# Patient Record
Sex: FEMALE | Race: WHITE | NOT HISPANIC OR LATINO | Employment: UNEMPLOYED | URBAN - METROPOLITAN AREA
[De-identification: names, ages, dates, MRNs, and addresses within clinical notes are randomized per-mention and may not be internally consistent; named-entity substitution may affect disease eponyms.]

---
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
## 2023-05-08 ENCOUNTER — OFFICE VISIT (OUTPATIENT)
Dept: PRIMARY CARE | Facility: CLINIC | Age: 63
End: 2023-05-08
Payer: COMMERCIAL

## 2023-05-08 VITALS
DIASTOLIC BLOOD PRESSURE: 66 MMHG | HEART RATE: 75 BPM | HEIGHT: 64 IN | RESPIRATION RATE: 16 BRPM | SYSTOLIC BLOOD PRESSURE: 116 MMHG | WEIGHT: 210 LBS | BODY MASS INDEX: 35.85 KG/M2

## 2023-05-08 DIAGNOSIS — E03.9 HYPOTHYROIDISM, UNSPECIFIED TYPE: ICD-10-CM

## 2023-05-08 DIAGNOSIS — F32.89 OTHER DEPRESSION: ICD-10-CM

## 2023-05-08 DIAGNOSIS — J84.10 CALCIFIED GRANULOMA OF LUNG (MULTI): ICD-10-CM

## 2023-05-08 DIAGNOSIS — Z00.00 BLOOD TESTS FOR ROUTINE GENERAL PHYSICAL EXAMINATION: ICD-10-CM

## 2023-05-08 DIAGNOSIS — E55.9 VITAMIN D DEFICIENCY: ICD-10-CM

## 2023-05-08 DIAGNOSIS — N95.1 INSOMNIA ASSOCIATED WITH MENOPAUSE: ICD-10-CM

## 2023-05-08 DIAGNOSIS — E53.8 B12 NUTRITIONAL DEFICIENCY: ICD-10-CM

## 2023-05-08 DIAGNOSIS — G43.001 MIGRAINE WITHOUT AURA AND WITH STATUS MIGRAINOSUS, NOT INTRACTABLE: Primary | ICD-10-CM

## 2023-05-08 DIAGNOSIS — E66.01 CLASS 3 SEVERE OBESITY WITH SERIOUS COMORBIDITY IN ADULT, UNSPECIFIED BMI, UNSPECIFIED OBESITY TYPE (MULTI): ICD-10-CM

## 2023-05-08 DIAGNOSIS — E78.5 HYPERLIPIDEMIA, UNSPECIFIED HYPERLIPIDEMIA TYPE: ICD-10-CM

## 2023-05-08 DIAGNOSIS — R73.03 PRE-DIABETES: ICD-10-CM

## 2023-05-08 DIAGNOSIS — J30.1 SEASONAL ALLERGIC RHINITIS DUE TO POLLEN: ICD-10-CM

## 2023-05-08 DIAGNOSIS — Z63.0 STRESS DUE TO MARITAL PROBLEMS: ICD-10-CM

## 2023-05-08 PROBLEM — E78.00 HYPERCHOLESTEROLEMIA: Status: ACTIVE | Noted: 2023-05-08

## 2023-05-08 PROBLEM — G47.00 INSOMNIA: Status: ACTIVE | Noted: 2023-05-08

## 2023-05-08 PROBLEM — T74.31XA: Status: RESOLVED | Noted: 2023-05-08 | Resolved: 2023-05-08

## 2023-05-08 PROBLEM — F32.A DEPRESSION: Status: ACTIVE | Noted: 2023-05-08

## 2023-05-08 PROBLEM — J30.9 ALLERGIC RHINITIS: Status: ACTIVE | Noted: 2023-05-08

## 2023-05-08 PROBLEM — R73.01 IFG (IMPAIRED FASTING GLUCOSE): Status: ACTIVE | Noted: 2023-05-08

## 2023-05-08 PROBLEM — R01.1 SYSTOLIC EJECTION MURMUR: Status: ACTIVE | Noted: 2023-05-08

## 2023-05-08 PROBLEM — R06.02 SOBOE (SHORTNESS OF BREATH ON EXERTION): Status: RESOLVED | Noted: 2023-05-08 | Resolved: 2023-05-08

## 2023-05-08 PROBLEM — F43.9 STRESS AT HOME: Status: ACTIVE | Noted: 2023-05-08

## 2023-05-08 PROBLEM — Z59.71 INSURANCE COVERAGE PROBLEMS: Status: RESOLVED | Noted: 2023-05-08 | Resolved: 2023-05-08

## 2023-05-08 PROBLEM — J98.4 CALCIFIED GRANULOMA OF LUNG: Status: ACTIVE | Noted: 2023-05-08

## 2023-05-08 PROBLEM — I70.0 ATHEROSCLEROSIS OF AORTA (CMS-HCC): Status: ACTIVE | Noted: 2023-05-08

## 2023-05-08 PROBLEM — Z59.89 INSURANCE COVERAGE PROBLEMS: Status: RESOLVED | Noted: 2023-05-08 | Resolved: 2023-05-08

## 2023-05-08 PROBLEM — L30.9 ECZEMA: Status: ACTIVE | Noted: 2023-05-08

## 2023-05-08 PROBLEM — R51.9 HEADACHE, CHRONIC DAILY: Status: RESOLVED | Noted: 2023-05-08 | Resolved: 2023-05-08

## 2023-05-08 PROBLEM — R82.90 ABNORMAL URINE FINDINGS: Status: RESOLVED | Noted: 2023-05-08 | Resolved: 2023-05-08

## 2023-05-08 PROBLEM — G43.009 MIGRAINE WITHOUT AURA AND WITHOUT STATUS MIGRAINOSUS, NOT INTRACTABLE: Status: ACTIVE | Noted: 2023-05-08

## 2023-05-08 PROBLEM — K58.9 IBS (IRRITABLE BOWEL SYNDROME): Status: ACTIVE | Noted: 2023-05-08

## 2023-05-08 PROBLEM — I35.1 AORTIC VALVE INSUFFICIENCY, ACQUIRED: Status: ACTIVE | Noted: 2023-05-08

## 2023-05-08 LAB — POC HEMOGLOBIN A1C: 5.4 % (ref 4.2–6.5)

## 2023-05-08 PROCEDURE — 1036F TOBACCO NON-USER: CPT | Performed by: INTERNAL MEDICINE

## 2023-05-08 PROCEDURE — 99215 OFFICE O/P EST HI 40 MIN: CPT | Performed by: INTERNAL MEDICINE

## 2023-05-08 PROCEDURE — 3008F BODY MASS INDEX DOCD: CPT | Performed by: INTERNAL MEDICINE

## 2023-05-08 PROCEDURE — 83036 HEMOGLOBIN GLYCOSYLATED A1C: CPT | Performed by: INTERNAL MEDICINE

## 2023-05-08 RX ORDER — METFORMIN HYDROCHLORIDE 500 MG/1
500 TABLET, EXTENDED RELEASE ORAL
Qty: 90 TABLET | Refills: 3 | Status: SHIPPED | OUTPATIENT
Start: 2023-05-08 | End: 2023-11-01 | Stop reason: SDUPTHER

## 2023-05-08 RX ORDER — THYROID 90 MG/1
TABLET ORAL
COMMUNITY
End: 2023-05-08 | Stop reason: SDUPTHER

## 2023-05-08 RX ORDER — THYROID 30 MG/1
15 TABLET ORAL 2 TIMES WEEKLY
Qty: 12 TABLET | Refills: 3 | Status: SHIPPED | OUTPATIENT
Start: 2023-05-08 | End: 2023-11-01 | Stop reason: SDUPTHER

## 2023-05-08 RX ORDER — THYROID 30 MG/1
TABLET ORAL
COMMUNITY
End: 2023-05-08 | Stop reason: SDUPTHER

## 2023-05-08 RX ORDER — VIT C/E/ZN/COPPR/LUTEIN/ZEAXAN 250MG-90MG
CAPSULE ORAL
COMMUNITY

## 2023-05-08 RX ORDER — METFORMIN HYDROCHLORIDE 500 MG/1
TABLET, EXTENDED RELEASE ORAL
COMMUNITY
Start: 2019-05-30 | End: 2023-05-08 | Stop reason: SDUPTHER

## 2023-05-08 RX ORDER — TOPIRAMATE 100 MG/1
100 TABLET, FILM COATED ORAL NIGHTLY
COMMUNITY
Start: 2023-04-18 | End: 2023-05-08 | Stop reason: SDUPTHER

## 2023-05-08 RX ORDER — HYDROCORTISONE 25 MG/G
CREAM TOPICAL
COMMUNITY
Start: 2022-12-27

## 2023-05-08 RX ORDER — MONTELUKAST SODIUM 4 MG/1
2 TABLET, CHEWABLE ORAL 2 TIMES DAILY
Qty: 360 TABLET | Refills: 3 | Status: SHIPPED | OUTPATIENT
Start: 2023-05-08 | End: 2023-11-01 | Stop reason: SDUPTHER

## 2023-05-08 RX ORDER — MONTELUKAST SODIUM 4 MG/1
TABLET, CHEWABLE ORAL
COMMUNITY
Start: 2016-10-05 | End: 2023-05-08 | Stop reason: SDUPTHER

## 2023-05-08 RX ORDER — LORATADINE 10 MG/1
10 TABLET ORAL DAILY
Qty: 30 TABLET | Refills: 11
Start: 2023-05-08 | End: 2024-05-07

## 2023-05-08 RX ORDER — UBIDECARENONE 75 MG
500 CAPSULE ORAL DAILY
Qty: 90 TABLET | Refills: 3 | Status: SHIPPED | OUTPATIENT
Start: 2023-05-08 | End: 2024-05-07

## 2023-05-08 RX ORDER — VENLAFAXINE HYDROCHLORIDE 75 MG/1
CAPSULE, EXTENDED RELEASE ORAL
COMMUNITY
Start: 2017-05-03 | End: 2023-05-08 | Stop reason: SDUPTHER

## 2023-05-08 RX ORDER — TRAZODONE HYDROCHLORIDE 50 MG/1
TABLET ORAL
Qty: 90 TABLET | Refills: 3 | Status: SHIPPED | OUTPATIENT
Start: 2023-05-08 | End: 2023-11-01 | Stop reason: SDUPTHER

## 2023-05-08 RX ORDER — VENLAFAXINE HYDROCHLORIDE 75 MG/1
75 CAPSULE, EXTENDED RELEASE ORAL 3 TIMES DAILY
Qty: 270 CAPSULE | Refills: 3 | Status: SHIPPED | OUTPATIENT
Start: 2023-05-08 | End: 2023-11-01 | Stop reason: SDUPTHER

## 2023-05-08 RX ORDER — UBIDECARENONE 75 MG
1 CAPSULE ORAL DAILY
COMMUNITY
Start: 2022-08-17 | End: 2023-05-08 | Stop reason: SDUPTHER

## 2023-05-08 RX ORDER — TOPIRAMATE 100 MG/1
100 TABLET, FILM COATED ORAL NIGHTLY
Qty: 90 TABLET | Refills: 3 | Status: SHIPPED | OUTPATIENT
Start: 2023-05-08 | End: 2023-11-01 | Stop reason: SDUPTHER

## 2023-05-08 RX ORDER — TRAZODONE HYDROCHLORIDE 50 MG/1
TABLET ORAL
COMMUNITY
End: 2023-05-08 | Stop reason: SDUPTHER

## 2023-05-08 RX ORDER — THYROID 90 MG/1
90 TABLET ORAL DAILY
Qty: 90 TABLET | Refills: 3 | Status: SHIPPED | OUTPATIENT
Start: 2023-05-08 | End: 2023-11-01 | Stop reason: SDUPTHER

## 2023-05-08 SDOH — ECONOMIC STABILITY: TRANSPORTATION INSECURITY
IN THE PAST 12 MONTHS, HAS LACK OF TRANSPORTATION KEPT YOU FROM MEETINGS, WORK, OR FROM GETTING THINGS NEEDED FOR DAILY LIVING?: NO

## 2023-05-08 SDOH — ECONOMIC STABILITY: INCOME INSECURITY: IN THE LAST 12 MONTHS, WAS THERE A TIME WHEN YOU WERE NOT ABLE TO PAY THE MORTGAGE OR RENT ON TIME?: NO

## 2023-05-08 SDOH — ECONOMIC STABILITY: HOUSING INSECURITY
IN THE LAST 12 MONTHS, WAS THERE A TIME WHEN YOU DID NOT HAVE A STEADY PLACE TO SLEEP OR SLEPT IN A SHELTER (INCLUDING NOW)?: NO

## 2023-05-08 SDOH — ECONOMIC STABILITY: HOUSING INSECURITY: IN THE LAST 12 MONTHS, HOW MANY PLACES HAVE YOU LIVED?: 1

## 2023-05-08 SDOH — HEALTH STABILITY: PHYSICAL HEALTH: ON AVERAGE, HOW MANY DAYS PER WEEK DO YOU ENGAGE IN MODERATE TO STRENUOUS EXERCISE (LIKE A BRISK WALK)?: 5 DAYS

## 2023-05-08 SDOH — ECONOMIC STABILITY: FOOD INSECURITY: WITHIN THE PAST 12 MONTHS, YOU WORRIED THAT YOUR FOOD WOULD RUN OUT BEFORE YOU GOT MONEY TO BUY MORE.: NEVER TRUE

## 2023-05-08 SDOH — SOCIAL STABILITY - SOCIAL INSECURITY: PROBLEMS IN RELATIONSHIP WITH SPOUSE OR PARTNER: Z63.0

## 2023-05-08 SDOH — ECONOMIC STABILITY: FOOD INSECURITY: WITHIN THE PAST 12 MONTHS, THE FOOD YOU BOUGHT JUST DIDN'T LAST AND YOU DIDN'T HAVE MONEY TO GET MORE.: NEVER TRUE

## 2023-05-08 SDOH — HEALTH STABILITY: PHYSICAL HEALTH: ON AVERAGE, HOW MANY MINUTES DO YOU ENGAGE IN EXERCISE AT THIS LEVEL?: 90 MIN

## 2023-05-08 SDOH — ECONOMIC STABILITY: TRANSPORTATION INSECURITY
IN THE PAST 12 MONTHS, HAS THE LACK OF TRANSPORTATION KEPT YOU FROM MEDICAL APPOINTMENTS OR FROM GETTING MEDICATIONS?: NO

## 2023-05-08 ASSESSMENT — SOCIAL DETERMINANTS OF HEALTH (SDOH)
WITHIN THE LAST YEAR, HAVE YOU BEEN HUMILIATED OR EMOTIONALLY ABUSED IN OTHER WAYS BY YOUR PARTNER OR EX-PARTNER?: YES
DO YOU BELONG TO ANY CLUBS OR ORGANIZATIONS SUCH AS CHURCH GROUPS UNIONS, FRATERNAL OR ATHLETIC GROUPS, OR SCHOOL GROUPS?: YES
IN A TYPICAL WEEK, HOW MANY TIMES DO YOU TALK ON THE PHONE WITH FAMILY, FRIENDS, OR NEIGHBORS?: TWICE A WEEK
HOW OFTEN DO YOU ATTEND CHURCH OR RELIGIOUS SERVICES?: MORE THAN 4 TIMES PER YEAR
WITHIN THE LAST YEAR, HAVE YOU BEEN KICKED, HIT, SLAPPED, OR OTHERWISE PHYSICALLY HURT BY YOUR PARTNER OR EX-PARTNER?: NO
WITHIN THE LAST YEAR, HAVE YOU BEEN AFRAID OF YOUR PARTNER OR EX-PARTNER?: NO
HOW OFTEN DO YOU GET TOGETHER WITH FRIENDS OR RELATIVES?: ONCE A WEEK
HOW OFTEN DO YOU ATTENT MEETINGS OF THE CLUB OR ORGANIZATION YOU BELONG TO?: MORE THAN 4 TIMES PER YEAR
HOW HARD IS IT FOR YOU TO PAY FOR THE VERY BASICS LIKE FOOD, HOUSING, MEDICAL CARE, AND HEATING?: NOT HARD AT ALL
WITHIN THE LAST YEAR, HAVE TO BEEN RAPED OR FORCED TO HAVE ANY KIND OF SEXUAL ACTIVITY BY YOUR PARTNER OR EX-PARTNER?: NO

## 2023-05-08 ASSESSMENT — PATIENT HEALTH QUESTIONNAIRE - PHQ9
2. FEELING DOWN, DEPRESSED OR HOPELESS: SEVERAL DAYS
10. IF YOU CHECKED OFF ANY PROBLEMS, HOW DIFFICULT HAVE THESE PROBLEMS MADE IT FOR YOU TO DO YOUR WORK, TAKE CARE OF THINGS AT HOME, OR GET ALONG WITH OTHER PEOPLE: SOMEWHAT DIFFICULT
SUM OF ALL RESPONSES TO PHQ9 QUESTIONS 1 & 2: 1
1. LITTLE INTEREST OR PLEASURE IN DOING THINGS: NOT AT ALL

## 2023-05-08 ASSESSMENT — PAIN SCALES - GENERAL: PAINLEVEL: 0-NO PAIN

## 2023-05-08 ASSESSMENT — LIFESTYLE VARIABLES
HOW OFTEN DO YOU HAVE SIX OR MORE DRINKS ON ONE OCCASION: NEVER
HOW MANY STANDARD DRINKS CONTAINING ALCOHOL DO YOU HAVE ON A TYPICAL DAY: PATIENT DOES NOT DRINK

## 2023-05-08 NOTE — PATIENT INSTRUCTIONS
Great job on the weight loss!  Glad you are going to marriage counseling.    Ear canals are fine.  Mild inflammation in nasal passages, consider temporarily going back on flonase and or saline nasal rinse at end of the day when you are inside for the night.    Same medications.   Follow up for annual and review of results august/sept.    Fasting blood work prior to that visit.  Instructions for fasting blood work.   Please do not consume calories for 10-12 hours prior to this blood test. It is ok to drink water, you should be hydrated.  Please do not take vitamins, supplements or thyroid medication before your blood is drawn this day.   Most lab  test results should be available for your review on the  My Chart portal within 48 hours.

## 2023-05-16 ENCOUNTER — TELEPHONE (OUTPATIENT)
Dept: PRIMARY CARE | Facility: CLINIC | Age: 63
End: 2023-05-16
Payer: COMMERCIAL

## 2023-05-16 NOTE — TELEPHONE ENCOUNTER
"I called her back. She started having left sided chest pain 3 days ago. It is a squeezing pain that comes and goes and is very painful when it happens. She worked out this am and it didn't hurt. She took naproxen and it didn't stop the pain and it doesn't change with exercise. No SOB, No SOBOE,  No arm pain, no jaw or neck pain. This squeezing pain does radiate into her upper inner back while the squeezing is happening. Pt is also having no appetite at all today. I have advised her to go to the ER ASAP.  She reports \"I will talk to the hubby and see what we can do\"  "

## 2023-05-16 NOTE — TELEPHONE ENCOUNTER
Patient she been having chest pains for the past 3 days    The pain is on the left side and it comes and goes. She states ir feel like a squeezing ing the left side.    Pt# [phone]  BN

## 2023-09-05 ENCOUNTER — APPOINTMENT (OUTPATIENT)
Dept: PRIMARY CARE | Facility: CLINIC | Age: 63
End: 2023-09-05
Payer: COMMERCIAL
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
## 2023-10-23 ENCOUNTER — LAB (OUTPATIENT)
Dept: LAB | Facility: LAB | Age: 63
End: 2023-10-23
Payer: COMMERCIAL

## 2023-10-23 DIAGNOSIS — E03.9 HYPOTHYROIDISM, UNSPECIFIED TYPE: ICD-10-CM

## 2023-10-23 DIAGNOSIS — R73.03 PRE-DIABETES: ICD-10-CM

## 2023-10-23 DIAGNOSIS — E53.8 B12 NUTRITIONAL DEFICIENCY: ICD-10-CM

## 2023-10-23 DIAGNOSIS — Z11.59 ENCOUNTER FOR HEPATITIS C SCREENING TEST FOR LOW RISK PATIENT: ICD-10-CM

## 2023-10-23 DIAGNOSIS — E78.5 HYPERLIPIDEMIA, UNSPECIFIED HYPERLIPIDEMIA TYPE: ICD-10-CM

## 2023-10-23 DIAGNOSIS — E55.9 VITAMIN D DEFICIENCY: ICD-10-CM

## 2023-10-23 DIAGNOSIS — Z00.00 BLOOD TESTS FOR ROUTINE GENERAL PHYSICAL EXAMINATION: ICD-10-CM

## 2023-10-23 LAB
25(OH)D3 SERPL-MCNC: 44 NG/ML (ref 30–100)
ALBUMIN SERPL BCP-MCNC: 4.4 G/DL (ref 3.4–5)
ALP SERPL-CCNC: 84 U/L (ref 33–136)
ALT SERPL W P-5'-P-CCNC: 18 U/L (ref 7–45)
ANION GAP SERPL CALC-SCNC: 14 MMOL/L (ref 10–20)
AST SERPL W P-5'-P-CCNC: 16 U/L (ref 9–39)
BASOPHILS # BLD AUTO: 0.05 X10*3/UL (ref 0–0.1)
BASOPHILS NFR BLD AUTO: 0.7 %
BILIRUB SERPL-MCNC: 0.7 MG/DL (ref 0–1.2)
BUN SERPL-MCNC: 18 MG/DL (ref 6–23)
CALCIUM SERPL-MCNC: 9.4 MG/DL (ref 8.6–10.6)
CHLORIDE SERPL-SCNC: 105 MMOL/L (ref 98–107)
CHOLEST SERPL-MCNC: 237 MG/DL (ref 0–199)
CHOLESTEROL/HDL RATIO: 4.3
CO2 SERPL-SCNC: 26 MMOL/L (ref 21–32)
CREAT SERPL-MCNC: 0.92 MG/DL (ref 0.5–1.05)
EOSINOPHIL # BLD AUTO: 0.21 X10*3/UL (ref 0–0.7)
EOSINOPHIL NFR BLD AUTO: 2.9 %
ERYTHROCYTE [DISTWIDTH] IN BLOOD BY AUTOMATED COUNT: 13.3 % (ref 11.5–14.5)
EST. AVERAGE GLUCOSE BLD GHB EST-MCNC: 114 MG/DL
GFR SERPL CREATININE-BSD FRML MDRD: 70 ML/MIN/1.73M*2
GLUCOSE SERPL-MCNC: 105 MG/DL (ref 74–99)
HBA1C MFR BLD: 5.6 %
HCT VFR BLD AUTO: 44 % (ref 36–46)
HCV AB SER QL: NONREACTIVE
HDLC SERPL-MCNC: 54.5 MG/DL
HGB BLD-MCNC: 13.9 G/DL (ref 12–16)
IMM GRANULOCYTES # BLD AUTO: 0.03 X10*3/UL (ref 0–0.7)
IMM GRANULOCYTES NFR BLD AUTO: 0.4 % (ref 0–0.9)
LDLC SERPL CALC-MCNC: 167 MG/DL
LDLC SERPL DIRECT ASSAY-MCNC: 168 MG/DL (ref 0–129)
LYMPHOCYTES # BLD AUTO: 1.59 X10*3/UL (ref 1.2–4.8)
LYMPHOCYTES NFR BLD AUTO: 21.8 %
MCH RBC QN AUTO: 29.6 PG (ref 26–34)
MCHC RBC AUTO-ENTMCNC: 31.6 G/DL (ref 32–36)
MCV RBC AUTO: 94 FL (ref 80–100)
MONOCYTES # BLD AUTO: 0.62 X10*3/UL (ref 0.1–1)
MONOCYTES NFR BLD AUTO: 8.5 %
NEUTROPHILS # BLD AUTO: 4.81 X10*3/UL (ref 1.2–7.7)
NEUTROPHILS NFR BLD AUTO: 65.7 %
NON HDL CHOLESTEROL: 183 MG/DL (ref 0–149)
NRBC BLD-RTO: 0 /100 WBCS (ref 0–0)
PLATELET # BLD AUTO: 232 X10*3/UL (ref 150–450)
PMV BLD AUTO: 11.1 FL (ref 7.5–11.5)
POTASSIUM SERPL-SCNC: 4.4 MMOL/L (ref 3.5–5.3)
PROT SERPL-MCNC: 6.8 G/DL (ref 6.4–8.2)
RBC # BLD AUTO: 4.7 X10*6/UL (ref 4–5.2)
SODIUM SERPL-SCNC: 141 MMOL/L (ref 136–145)
TRIGL SERPL-MCNC: 80 MG/DL (ref 0–149)
TSH SERPL-ACNC: 3.26 MIU/L (ref 0.44–3.98)
VIT B12 SERPL-MCNC: 955 PG/ML (ref 211–911)
VLDL: 16 MG/DL (ref 0–40)
WBC # BLD AUTO: 7.3 X10*3/UL (ref 4.4–11.3)

## 2023-10-23 PROCEDURE — 86803 HEPATITIS C AB TEST: CPT

## 2023-10-23 PROCEDURE — 80053 COMPREHEN METABOLIC PANEL: CPT

## 2023-10-23 PROCEDURE — 84443 ASSAY THYROID STIM HORMONE: CPT

## 2023-10-23 PROCEDURE — 83036 HEMOGLOBIN GLYCOSYLATED A1C: CPT

## 2023-10-23 PROCEDURE — 82607 VITAMIN B-12: CPT

## 2023-10-23 PROCEDURE — 85025 COMPLETE CBC W/AUTO DIFF WBC: CPT

## 2023-10-23 PROCEDURE — 82306 VITAMIN D 25 HYDROXY: CPT

## 2023-10-23 PROCEDURE — 83721 ASSAY OF BLOOD LIPOPROTEIN: CPT

## 2023-10-23 PROCEDURE — 80061 LIPID PANEL: CPT

## 2023-10-23 PROCEDURE — 36415 COLL VENOUS BLD VENIPUNCTURE: CPT

## 2023-10-23 NOTE — RESULT ENCOUNTER NOTE
I have reviewed these results and will discuss them with the patient at their upcoming appointment.  Cholesterol elevated/not at goal. B12 is ok.

## 2023-10-31 NOTE — PROGRESS NOTES
Subjective   Patient ID: Karis Adrian is a 63 y.o. female who presents for annual physical and follow up on conditions.  LAST VISIT PLAN 5/8/23  Problem List Items Addressed This Visit         Allergic rhinitis     Relevant Medications     loratadine (Claritin) 10 mg tablet  Consider flonase restart and or saline end of day nasal spray     B12 nutritional deficiency     Relevant Medications     cyanocobalamin (Vitamin B-12) 500 mcg tablet refilled     Other Relevant Orders     Vitamin B12     Calcified granuloma of lung (CMS/HCC)     Hypothyroidism     Relevant Medications     thyroid, pork, ([place] Thyroid) 90 mg tablet refilled     thyroid, pork, ([place] Thyroid) 30 mg tablet refilled     Other Relevant Orders     TSH with reflex to Free T4 if abnormal      Depression     Relevant Medications     venlafaxine XR (Effexor-XR) 75 mg 24 hr capsule refilled  Gla dthey are going to marriage counseling     Vitamin D deficiency     Relevant Orders     Vitamin D, Total      Other Visit Diagnoses         Migraine without aura and with status migrainosus, not intractable    -  Primary     Relevant Medications     topiramate (Topamax) 100 mg tablet     Hyperlipidemia, unspecified hyperlipidemia type         Relevant Medications     colestipol (Colestid) 1 gram tablet     Other Relevant Orders     Cholesterol, LDL Direct     Pre-diabetes         Relevant Medications     metFORMIN XR (Glucophage-XR) 500 mg 24 hr tablet     Other Relevant Orders     Hemoglobin L9Y--[id number]!!     Insomnia associated with menopause         Relevant Medications     traZODone (Desyrel) 50 mg tablet     Blood tests for routine general physical examination         Relevant Orders     CBC and Auto Differential     Lipid Panel     Comprehensive Metabolic Panel     TSH with reflex to Free T4 if abnormal     Stress due to marital problems    better, counseling.   Obesity  improving. Continue topiramate.  END INFO FROM PRIOR VISIT (SEE SCANNED ER  DISCHARGE SUMMARY FROM 6/9/23)  HPI  Pt with pre dm, obesity insomnia, anxiety, headaches and hypothyroidism here for annual check up. And pelvic, pap  Had cp-negative work up at er.  No further cp since in er may and was muscle strain.  Ran out of topiramate 6 weeks ago and now more headaches/migraines. Not as bad as pre topiramate  Health maintenance items last completed:  Colonoscopy: 9/22/22, 3 mm and 5 mm polyps. Diffuse diverticulosis.  Mammogram: 12/30/22 cat 1 - negative   Bone Density:   Urine albumin if HTN or DM2:   Pap: 05/30/2019 negative and today  Pelvic:today  Breast exam in office:today  Vaccines due or not completed: done today flu shot  Last Health Maintenance exam: 8/17/22  Asking about home sleep study as sister diagnosed and they told her it could be hereditary.  Sister is overweight as well    Sleep-trouble falling asleep but more the issue is waking up constantly during the night, wakes up 5-6 times if no trazodone.  Feels tired in am. Whether takes med or not.  Takes a nap every day but her whole family does that  No one dxd with narcolepsy  Not sure she could stay awake in the car for an hour if nothing to do  At night concern falllasleep driing.  Eats to keep awake while driving    Scribe Transcription:  She states her mood has been okay. She is taking 225 mg of venlafaxine. Things are better at home with her .    She states she has had significant weight gain. She has been having trouble falling asleep and has been waking up frequently throughout the night. If she doesn’t take trazodone at night she will wake 5-6 times per night. She reports feeling tired when she wakes up every morning. She feels tired often during the day stating most of her family takes naps during the day.     She reports anosmia and cannot smell coffee among other things. X several years.    Scribe Attestation  By signing my name below, ISharath, Carla   attest that this documentation has been  prepared under the direction and in the presence of Roseanne Correia MD.   Review of Systems  All systems reviewed and are negative unless otherwise stated in HPI or noted in writing on the written   7 page history and review of systems document reviewed by me and  scanned in with this visit.  No longer has periods as is s/p hyst, cerivx an dovaries remain so still eeds a pap. Only one partner.  Current Outpatient Medications   Medication Instructions    cholecalciferol (Vitamin D-3) 25 MCG (1000 UT) capsule oral    colestipol (COLESTID) 2 g, oral, 2 times daily    cyanocobalamin (VITAMIN B-12) 500 mcg, oral, Daily    hydrocortisone 2.5 % cream APPLY to rash on the left neck area TWO TO THREE TIMES DAILY for 2 WEEKS    loratadine (CLARITIN) 10 mg, oral, Daily    metFORMIN XR (GLUCOPHAGE-XR) 500 mg, oral, Daily RT    thyroid (pork) (ARMOUR THYROID) 90 mg, oral, Daily    thyroid (pork) (ARMOUR THYROID) 15 mg, oral, 2 times weekly, Take by mouth.    topiramate (TOPAMAX) 100 mg, oral, Nightly    traZODone (Desyrel) 50 mg tablet TAKE 1/2 (ONE-HALF) TO 1 (ONE) TABLET AT BEDTIME AS NEEDED for sleeplessness    venlafaxine XR (EFFEXOR-XR) 75 mg, oral, 3 times daily     No Known Allergies    Objective     Lab Results   Component Value Date    WBC 7.3 10/23/2023    HGB 13.9 10/23/2023    HCT 44.0 10/23/2023     10/23/2023    CHOL 237 (H) 10/23/2023    TRIG 80 10/23/2023    HDL 54.5 10/23/2023    LDLDIRECT 168 (H) 10/23/2023    ALT 18 10/23/2023    AST 16 10/23/2023     10/23/2023    K 4.4 10/23/2023     10/23/2023    CREATININE 0.92 10/23/2023    BUN 18 10/23/2023    CO2 26 10/23/2023    TSH 3.26 10/23/2023    GLUF 110 (H) 02/02/2022    HGBA1C 5.6 10/23/2023     Lab Results   Component Value Date    CHOL 237 (H) 10/23/2023    CHOL 201 (H) 08/15/2022    CHOL 219 (H) 02/02/2022     Lab Results   Component Value Date    HDL 54.5 10/23/2023    HDL 52.8 08/15/2022    HDL 54.2 02/02/2022     Lab Results  "  Component Value Date    LDLCALC 167 (H) 10/23/2023     Lab Results   Component Value Date    TRIG 80 10/23/2023    TRIG 127 08/15/2022    TRIG 107 02/02/2022     Lab Results   Component Value Date    TSH 3.26 10/23/2023           Physical Exam  /70 (BP Location: Right arm, Patient Position: Sitting, BP Cuff Size: Adult)   Pulse 64   Resp 18   Ht 1.6 m (5' 3\")   Wt 91.6 kg (202 lb)   BMI 35.78 kg/m²       3/14/2022     7:32 PM 5/10/2022     9:39 AM 8/17/2022    10:04 AM 10/6/2022     9:46 AM 12/27/2022    10:20 AM 5/8/2023     1:43 PM 11/1/2023    11:32 AM   Vitals   Systolic 143 142  126 104 116 122   Diastolic 83 80  62 66 66 70   Heart Rate 94 83  72 64 75 64   Temp 36.4 °C (97.6 °F) 36.8 °C (98.3 °F)        Resp 16 14  18 16 16 18   Height (in) 1.619 m (5' 3.75\") 1.6 m (5' 3\") 1.6 m (5' 3\")   1.626 m (5' 4\") 1.6 m (5' 3\")   Weight (lb) 227.07 227 223 223 216 210 202   BMI 39.28 kg/m2 40.21 kg/m2 39.5 kg/m2 39.5 kg/m2 38.26 kg/m2 36.05 kg/m2 35.78 kg/m2   BSA (m2) 2.15 m2 2.14 m2 2.12 m2 2.12 m2 2.09 m2 2.07 m2 2.02 m2   Visit Report      Report Report     She thinks weight was 212 today not 202.    General: Patient is known to me, looks well, is in usual state of health, with  no obvious distress.  Head: normocephalic  Eyes: PERRL, EOMI, no scleral icterus or conjunctival abnormality  Ears:Normal canals and TM's  Oropharynx: Well hydrated mucosa. no lesions, erythema. palate moves well.  Neck: No masses, no cervical (A/P/ or Lateral) adenopathy. Thyroid not enlarged and no nodules. Carotid pulses normal and no bruits.  Chest: Normal AP diameter. No supraclavicular adenopathy.  Lungs: Clear to auscultation: no rales , wheezes, rhonchi, rubs, examined bilaterally, ant/post /lateral. RR normal.  Heart: RRR. No GCR S1 S2 normal.1-2/6 systolic murmur ulsb and ursb no radiationfaint diastolc murmur llsb  Abd: BS normal, no bruits. No hepatosplenomegaly. No abdominal mass or tenderness. No " distension.  Extremities: No upper extremity edema. No lower leg edema.No ischemia.   Joints: no synovitis seen. No deformities.  Pulses: normal posterior tibialis pulses, normal dorsalis pedis pulses. normal radial pulses. Normal femoral pulses without bruits.  Neuro: CN 2-12 intact . Alert, oriented, appropriate.  No focal weakness observed. No tremors. Ambulation is normal .  Psych: Mood and affect normal. No cognitive deficits noted during this exam. Alert, oriented, appropriate.  Skin: No rash, petechiae or excessive bruising.  Lymph: no SC axillary or inguinal adenopathy  Breast Exam : Symmetric appearance. No masses, nipple discharge, skin retraction, axillary or supraclavicular adenopathy.  Gyn: Normal pelvic exam: External Genitalia without lesions. Urethra normal. Speculum exam: no lesions or vaginal discharge, cervix without lesions. Bimanual exam: uterine absent. No ovarian masses. No anal/perineal lesions. Recto vaginal exam normal. (Pt declined chaperone)    Karis was seen today for annual exam.  Diagnoses and all orders for this visit:  Wakes up during night (Primary)  -     In-Center Sleep Study (Non-Sleep Provider Only); Future  Hyperlipidemia, unspecified hyperlipidemia type  -     colestipol (Colestid) 1 gram tablet; Take 2 tablets (2 g) by mouth 2 times a day.  Pre-diabetes  -     Discontinue: metFORMIN  mg 24 hr tablet; Take 1 tablet (500 mg) by mouth once daily.  -     metFORMIN  mg 24 hr tablet; Take 4 tablets (2,000 mg) by mouth once daily.  Hypothyroidism, unspecified type  -     thyroid, pork, ([place] Thyroid) 30 mg tablet; Take 0.5 tablets (15 mg) by mouth 2 times a week. Take by mouth.  -     thyroid, pork, ([place] Thyroid) 90 mg tablet; Take 1 tablet (90 mg) by mouth once daily.  Migraine without aura and with status migrainosus, not intractable  -     topiramate (Topamax) 100 mg tablet; Take 1 tablet (100 mg) by mouth once daily at bedtime.  Other depression  -      venlafaxine XR (Effexor-XR) 75 mg 24 hr capsule; Take 3 capsules (225 mg) by mouth once daily.  Insomnia associated with menopause  -     traZODone (Desyrel) 50 mg tablet; TAKE 1/2 (ONE-HALF) TO 1 (ONE) TABLET AT BEDTIME AS NEEDED for sleeplessness  Daytime somnolence  -     In-Center Sleep Study (Non-Sleep Provider Only); Future  Family history of sleep apnea  -     In-Center Sleep Study (Non-Sleep Provider Only); Future  Screening for cervical cancer  -     THINPREP PAP TEST  -     HPV DNA High Risk With Genotype  Visit for screening mammogram  -     BI mammo bilateral screening tomosynthesis; Future  Anosmia  -     Cancel: Referral to ENT; Future  -     Referral to ENT; Future  Hypercholesterolemia  -     Lipid panel; Future  Decreased hearing, unspecified laterality  -     Referral to ENT; Future  Need for influenza vaccination  -     Flu vaccine (IIV4) age 6 months and greater, preservative free    Relationship with  has improved, she is working on how she reacts to things he says and he is working on things as well.an dthey had some counseling which helped.  See wrap up section for patient instructions and  additional treatment plan.      Please get back on low chol diet. Mediterranean diet.  Referral to sleep lab at [facility] for sleep study.    Referral to ENT for loss of smell: Tell them you prefer uh Reta, and if that is not an option, then uh prmjenae such as dr Christiano Ngo or other.    When you restart metformin: take one per day first week then increase each week by one per day.  Topiramate: ask pharmacist if you can cut the tabs, take 50mg nightly for 2 weeks then 100mg. If not able to cut the tabs, call and will send a temporary supply of lower dose.    Mammogram due after 12-30-23.    Fasting lipid panel when back on meds, in 3-4 months.    Follow up 4 months.  Flu shot done today.  Updated covid booster suggested.

## 2023-11-01 ENCOUNTER — OFFICE VISIT (OUTPATIENT)
Dept: PRIMARY CARE | Facility: CLINIC | Age: 63
End: 2023-11-01
Payer: COMMERCIAL

## 2023-11-01 VITALS
HEART RATE: 64 BPM | DIASTOLIC BLOOD PRESSURE: 70 MMHG | BODY MASS INDEX: 38.98 KG/M2 | SYSTOLIC BLOOD PRESSURE: 122 MMHG | WEIGHT: 220 LBS | HEIGHT: 63 IN | RESPIRATION RATE: 18 BRPM

## 2023-11-01 DIAGNOSIS — G43.001 MIGRAINE WITHOUT AURA AND WITH STATUS MIGRAINOSUS, NOT INTRACTABLE: ICD-10-CM

## 2023-11-01 DIAGNOSIS — H91.90 DECREASED HEARING, UNSPECIFIED LATERALITY: ICD-10-CM

## 2023-11-01 DIAGNOSIS — G47.8 WAKES UP DURING NIGHT: Primary | ICD-10-CM

## 2023-11-01 DIAGNOSIS — Z00.00 ENCOUNTER FOR ANNUAL PHYSICAL EXAM: ICD-10-CM

## 2023-11-01 DIAGNOSIS — Z12.4 SCREENING FOR CERVICAL CANCER: ICD-10-CM

## 2023-11-01 DIAGNOSIS — R73.03 PRE-DIABETES: ICD-10-CM

## 2023-11-01 DIAGNOSIS — R40.0 DAYTIME SOMNOLENCE: ICD-10-CM

## 2023-11-01 DIAGNOSIS — F32.89 OTHER DEPRESSION: ICD-10-CM

## 2023-11-01 DIAGNOSIS — E03.9 HYPOTHYROIDISM, UNSPECIFIED TYPE: ICD-10-CM

## 2023-11-01 DIAGNOSIS — Z12.31 VISIT FOR SCREENING MAMMOGRAM: ICD-10-CM

## 2023-11-01 DIAGNOSIS — R43.0 ANOSMIA: ICD-10-CM

## 2023-11-01 DIAGNOSIS — Z23 NEED FOR INFLUENZA VACCINATION: ICD-10-CM

## 2023-11-01 DIAGNOSIS — N95.1 INSOMNIA ASSOCIATED WITH MENOPAUSE: ICD-10-CM

## 2023-11-01 DIAGNOSIS — Z82.0 FAMILY HISTORY OF SLEEP APNEA: ICD-10-CM

## 2023-11-01 DIAGNOSIS — E78.5 HYPERLIPIDEMIA, UNSPECIFIED HYPERLIPIDEMIA TYPE: ICD-10-CM

## 2023-11-01 DIAGNOSIS — E78.00 HYPERCHOLESTEROLEMIA: ICD-10-CM

## 2023-11-01 PROBLEM — T14.8XXA INJURY OF MUSCULOSKELETAL SYSTEM: Status: RESOLVED | Noted: 2023-05-17 | Resolved: 2023-11-01

## 2023-11-01 PROBLEM — R07.9 CHEST PAIN: Status: RESOLVED | Noted: 2023-05-16 | Resolved: 2023-11-01

## 2023-11-01 PROBLEM — F41.9 ANXIETY: Status: ACTIVE | Noted: 2023-05-17

## 2023-11-01 PROCEDURE — 99396 PREV VISIT EST AGE 40-64: CPT | Performed by: INTERNAL MEDICINE

## 2023-11-01 PROCEDURE — 3008F BODY MASS INDEX DOCD: CPT | Performed by: INTERNAL MEDICINE

## 2023-11-01 PROCEDURE — 90471 IMMUNIZATION ADMIN: CPT | Performed by: INTERNAL MEDICINE

## 2023-11-01 PROCEDURE — 87624 HPV HI-RISK TYP POOLED RSLT: CPT

## 2023-11-01 PROCEDURE — 1036F TOBACCO NON-USER: CPT | Performed by: INTERNAL MEDICINE

## 2023-11-01 PROCEDURE — 88175 CYTOPATH C/V AUTO FLUID REDO: CPT

## 2023-11-01 PROCEDURE — 90686 IIV4 VACC NO PRSV 0.5 ML IM: CPT | Performed by: INTERNAL MEDICINE

## 2023-11-01 PROCEDURE — 99213 OFFICE O/P EST LOW 20 MIN: CPT | Performed by: INTERNAL MEDICINE

## 2023-11-01 RX ORDER — THYROID 90 MG/1
90 TABLET ORAL DAILY
Qty: 90 TABLET | Refills: 3 | Status: SHIPPED | OUTPATIENT
Start: 2023-11-01

## 2023-11-01 RX ORDER — MONTELUKAST SODIUM 4 MG/1
2 TABLET, CHEWABLE ORAL 2 TIMES DAILY
Qty: 180 TABLET | Refills: 2 | Status: SHIPPED | OUTPATIENT
Start: 2023-11-01 | End: 2024-04-29 | Stop reason: SDUPTHER

## 2023-11-01 RX ORDER — TOPIRAMATE 100 MG/1
100 TABLET, FILM COATED ORAL NIGHTLY
Qty: 90 TABLET | Refills: 2 | Status: SHIPPED | OUTPATIENT
Start: 2023-11-01

## 2023-11-01 RX ORDER — METFORMIN HYDROCHLORIDE 500 MG/1
500 TABLET, EXTENDED RELEASE ORAL
Qty: 90 TABLET | Refills: 3 | Status: SHIPPED | OUTPATIENT
Start: 2023-11-01 | End: 2023-11-01 | Stop reason: ENTERED-IN-ERROR

## 2023-11-01 RX ORDER — VENLAFAXINE HYDROCHLORIDE 75 MG/1
225 CAPSULE, EXTENDED RELEASE ORAL DAILY
Qty: 270 CAPSULE | Refills: 2 | Status: SHIPPED | OUTPATIENT
Start: 2023-11-01

## 2023-11-01 RX ORDER — TRAZODONE HYDROCHLORIDE 50 MG/1
TABLET ORAL
Qty: 90 TABLET | Refills: 3 | Status: SHIPPED | OUTPATIENT
Start: 2023-11-01

## 2023-11-01 RX ORDER — THYROID 30 MG/1
15 TABLET ORAL 2 TIMES WEEKLY
Qty: 12 TABLET | Refills: 3 | Status: SHIPPED | OUTPATIENT
Start: 2023-11-02

## 2023-11-01 RX ORDER — METFORMIN HYDROCHLORIDE 500 MG/1
2000 TABLET, EXTENDED RELEASE ORAL
Qty: 360 TABLET | Refills: 3 | Status: SHIPPED | OUTPATIENT
Start: 2023-11-01

## 2023-11-01 SDOH — ECONOMIC STABILITY: FOOD INSECURITY: WITHIN THE PAST 12 MONTHS, THE FOOD YOU BOUGHT JUST DIDN'T LAST AND YOU DIDN'T HAVE MONEY TO GET MORE.: NEVER TRUE

## 2023-11-01 SDOH — ECONOMIC STABILITY: GENERAL
WHICH OF THE FOLLOWING DO YOU KNOW HOW TO USE AND HAVE ACCESS TO EVERY DAY? (CHOOSE ALL THAT APPLY): DESKTOP COMPUTER, LAPTOP COMPUTER, OR TABLET WITH BROADBAND INTERNET CONNECTION;SMARTPHONE WITH CELLULAR DATA PLAN

## 2023-11-01 SDOH — ECONOMIC STABILITY: FOOD INSECURITY: WITHIN THE PAST 12 MONTHS, YOU WORRIED THAT YOUR FOOD WOULD RUN OUT BEFORE YOU GOT MONEY TO BUY MORE.: NEVER TRUE

## 2023-11-01 SDOH — ECONOMIC STABILITY: INCOME INSECURITY: IN THE LAST 12 MONTHS, WAS THERE A TIME WHEN YOU WERE NOT ABLE TO PAY THE MORTGAGE OR RENT ON TIME?: NO

## 2023-11-01 SDOH — HEALTH STABILITY: PHYSICAL HEALTH: ON AVERAGE, HOW MANY MINUTES DO YOU ENGAGE IN EXERCISE AT THIS LEVEL?: 120 MIN

## 2023-11-01 SDOH — HEALTH STABILITY: PHYSICAL HEALTH: ON AVERAGE, HOW MANY DAYS PER WEEK DO YOU ENGAGE IN MODERATE TO STRENUOUS EXERCISE (LIKE A BRISK WALK)?: 3 DAYS

## 2023-11-01 SDOH — ECONOMIC STABILITY: HOUSING INSECURITY: IN THE LAST 12 MONTHS, HOW MANY PLACES HAVE YOU LIVED?: 1

## 2023-11-01 ASSESSMENT — COLUMBIA-SUICIDE SEVERITY RATING SCALE - C-SSRS
1. IN THE PAST MONTH, HAVE YOU WISHED YOU WERE DEAD OR WISHED YOU COULD GO TO SLEEP AND NOT WAKE UP?: NO
6. HAVE YOU EVER DONE ANYTHING, STARTED TO DO ANYTHING, OR PREPARED TO DO ANYTHING TO END YOUR LIFE?: NO
2. HAVE YOU ACTUALLY HAD ANY THOUGHTS OF KILLING YOURSELF?: NO

## 2023-11-01 ASSESSMENT — LIFESTYLE VARIABLES
HOW OFTEN DO YOU HAVE A DRINK CONTAINING ALCOHOL: NEVER
HOW MANY STANDARD DRINKS CONTAINING ALCOHOL DO YOU HAVE ON A TYPICAL DAY: PATIENT DOES NOT DRINK
HOW OFTEN DO YOU HAVE SIX OR MORE DRINKS ON ONE OCCASION: NEVER
AUDIT-C TOTAL SCORE: 0
SKIP TO QUESTIONS 9-10: 1

## 2023-11-01 ASSESSMENT — SOCIAL DETERMINANTS OF HEALTH (SDOH)
HOW OFTEN DO YOU GET TOGETHER WITH FRIENDS OR RELATIVES?: MORE THAN THREE TIMES A WEEK
IN THE PAST 12 MONTHS, HAS THE ELECTRIC, GAS, OIL, OR WATER COMPANY THREATENED TO SHUT OFF SERVICE IN YOUR HOME?: NO
HOW OFTEN DO YOU ATTENT MEETINGS OF THE CLUB OR ORGANIZATION YOU BELONG TO?: NEVER
WITHIN THE LAST YEAR, HAVE YOU BEEN HUMILIATED OR EMOTIONALLY ABUSED IN OTHER WAYS BY YOUR PARTNER OR EX-PARTNER?: NO
WITHIN THE LAST YEAR, HAVE TO BEEN RAPED OR FORCED TO HAVE ANY KIND OF SEXUAL ACTIVITY BY YOUR PARTNER OR EX-PARTNER?: NO
HOW HARD IS IT FOR YOU TO PAY FOR THE VERY BASICS LIKE FOOD, HOUSING, MEDICAL CARE, AND HEATING?: NOT HARD AT ALL
WITHIN THE LAST YEAR, HAVE YOU BEEN KICKED, HIT, SLAPPED, OR OTHERWISE PHYSICALLY HURT BY YOUR PARTNER OR EX-PARTNER?: NO
WITHIN THE LAST YEAR, HAVE YOU BEEN AFRAID OF YOUR PARTNER OR EX-PARTNER?: NO
HOW OFTEN DO YOU ATTEND CHURCH OR RELIGIOUS SERVICES?: NEVER
DO YOU BELONG TO ANY CLUBS OR ORGANIZATIONS SUCH AS CHURCH GROUPS UNIONS, FRATERNAL OR ATHLETIC GROUPS, OR SCHOOL GROUPS?: NO
IN A TYPICAL WEEK, HOW MANY TIMES DO YOU TALK ON THE PHONE WITH FAMILY, FRIENDS, OR NEIGHBORS?: MORE THAN THREE TIMES A WEEK

## 2023-11-01 ASSESSMENT — PROMIS GLOBAL HEALTH SCALE
EMOTIONAL_PROBLEMS: NEVER
RATE_AVERAGE_PAIN: 0
CARRYOUT_PHYSICAL_ACTIVITIES: COMPLETELY
RATE_GENERAL_HEALTH: GOOD
CARRYOUT_SOCIAL_ACTIVITIES: GOOD
RATE_PHYSICAL_HEALTH: GOOD
RATE_QUALITY_OF_LIFE: VERY GOOD
RATE_AVERAGE_FATIGUE: MILD
RATE_MENTAL_HEALTH: VERY GOOD
RATE_SOCIAL_SATISFACTION: VERY GOOD

## 2023-11-01 ASSESSMENT — ANXIETY QUESTIONNAIRES
4. TROUBLE RELAXING: NOT AT ALL
GAD7 TOTAL SCORE: 0
2. NOT BEING ABLE TO STOP OR CONTROL WORRYING: NOT AT ALL
5. BEING SO RESTLESS THAT IT IS HARD TO SIT STILL: NOT AT ALL
3. WORRYING TOO MUCH ABOUT DIFFERENT THINGS: NOT AT ALL
1. FEELING NERVOUS, ANXIOUS, OR ON EDGE: NOT AT ALL
6. BECOMING EASILY ANNOYED OR IRRITABLE: NOT AT ALL
IF YOU CHECKED OFF ANY PROBLEMS ON THIS QUESTIONNAIRE, HOW DIFFICULT HAVE THESE PROBLEMS MADE IT FOR YOU TO DO YOUR WORK, TAKE CARE OF THINGS AT HOME, OR GET ALONG WITH OTHER PEOPLE: NOT DIFFICULT AT ALL
7. FEELING AFRAID AS IF SOMETHING AWFUL MIGHT HAPPEN: NOT AT ALL

## 2023-11-01 ASSESSMENT — PATIENT HEALTH QUESTIONNAIRE - PHQ9
2. FEELING DOWN, DEPRESSED OR HOPELESS: NOT AT ALL
SUM OF ALL RESPONSES TO PHQ9 QUESTIONS 1 & 2: 0
1. LITTLE INTEREST OR PLEASURE IN DOING THINGS: NOT AT ALL

## 2023-11-01 ASSESSMENT — PAIN SCALES - GENERAL: PAINLEVEL: 0-NO PAIN

## 2023-11-01 NOTE — PATIENT INSTRUCTIONS
Please get back on low chol diet. Mediterranean diet.  Referral to sleep lab at [facility] for sleep study.    Referral to ENT for loss of smell: Tell them you prefer  Reta, and if that is not an option, then  prma such as dr Christiano Ngo or other.    When you restart metformin: take one per day first week then increase each week by one per day.  Topiramate: ask pharmacist if you can cut the tabs, take 50mg nightly for 2 weeks then 100mg. If not able to cut the tabs, call and will send a temporary supply of lower dose.    Mammogram due after 12-30-23.    Fasting lipid panel when back on meds, in 3-4 months.    Follow up 4 months.  Flu shot done today.  Updated covid booster suggested.

## 2023-11-18 NOTE — RESULT ENCOUNTER NOTE
Please call the patient regarding her result.  Pap and hpv tests both negative, repeat pap in 3-5 years.

## 2023-11-30 ENCOUNTER — APPOINTMENT (OUTPATIENT)
Dept: SLEEP MEDICINE | Facility: CLINIC | Age: 63
End: 2023-11-30
Payer: COMMERCIAL

## 2024-03-04 ENCOUNTER — APPOINTMENT (OUTPATIENT)
Dept: PRIMARY CARE | Facility: CLINIC | Age: 64
End: 2024-03-04
Payer: COMMERCIAL

## 2024-03-19 ENCOUNTER — APPOINTMENT (OUTPATIENT)
Dept: PRIMARY CARE | Facility: CLINIC | Age: 64
End: 2024-03-19
Payer: COMMERCIAL

## 2024-04-29 DIAGNOSIS — E78.5 HYPERLIPIDEMIA, UNSPECIFIED HYPERLIPIDEMIA TYPE: ICD-10-CM

## 2024-04-29 NOTE — TELEPHONE ENCOUNTER
Med refill    colestipol (Colestid) 1 gram tablet   Take 2 tablets (2 g) by mouth 2 times a day.     [facility] [address]    She is paying through [employer]

## 2024-04-30 RX ORDER — MONTELUKAST SODIUM 4 MG/1
2 TABLET, CHEWABLE ORAL 2 TIMES DAILY
Qty: 180 TABLET | Refills: 1 | Status: SHIPPED | OUTPATIENT
Start: 2024-04-30

## 2024-06-18 ENCOUNTER — APPOINTMENT (OUTPATIENT)
Dept: PRIMARY CARE | Facility: CLINIC | Age: 64
End: 2024-06-18
Payer: COMMERCIAL

## 2024-07-03 ENCOUNTER — LAB (OUTPATIENT)
Dept: LAB | Facility: LAB | Age: 64
End: 2024-07-03
Payer: COMMERCIAL

## 2024-07-03 DIAGNOSIS — E78.00 HYPERCHOLESTEROLEMIA: ICD-10-CM

## 2024-07-03 LAB
CHOLEST SERPL-MCNC: 213 MG/DL (ref 0–199)
CHOLESTEROL/HDL RATIO: 4.3
HDLC SERPL-MCNC: 49.3 MG/DL
LDLC SERPL CALC-MCNC: 130 MG/DL
NON HDL CHOLESTEROL: 164 MG/DL (ref 0–149)
TRIGL SERPL-MCNC: 168 MG/DL (ref 0–149)
VLDL: 34 MG/DL (ref 0–40)

## 2024-07-03 PROCEDURE — 36415 COLL VENOUS BLD VENIPUNCTURE: CPT

## 2024-07-03 PROCEDURE — 80061 LIPID PANEL: CPT

## 2024-07-12 NOTE — PROGRESS NOTES
Patient ID: Karis Adrian is a 64 y.o. female who presents for Follow-up (Karis is here for follow up, would like to go over her left knee injury.)  LAST VISIT PLAN FROM: 11/01/2023  Karis was seen today for annual exam.  Diagnoses and all orders for this visit:  Wakes up during night (Primary)  -     In-Center Sleep Study (Non-Sleep Provider Only); Future  Hyperlipidemia, unspecified hyperlipidemia type  -     colestipol (Colestid) 1 gram tablet; Take 2 tablets (2 g) by mouth 2 times a day.  Pre-diabetes  -     Discontinue: metFORMIN  mg 24 hr tablet; Take 1 tablet (500 mg) by mouth once daily.  -     metFORMIN  mg 24 hr tablet; Take 4 tablets (2,000 mg) by mouth once daily.  Hypothyroidism, unspecified type  -     thyroid, pork, ([place] Thyroid) 30 mg tablet; Take 0.5 tablets (15 mg) by mouth 2 times a week. Take by mouth.  -     thyroid, pork, ([place] Thyroid) 90 mg tablet; Take 1 tablet (90 mg) by mouth once daily.  Migraine without aura and with status migrainosus, not intractable  -     topiramate (Topamax) 100 mg tablet; Take 1 tablet (100 mg) by mouth once daily at bedtime.  Other depression  -     venlafaxine XR (Effexor-XR) 75 mg 24 hr capsule; Take 3 capsules (225 mg) by mouth once daily.  Insomnia associated with menopause  -     traZODone (Desyrel) 50 mg tablet; TAKE 1/2 (ONE-HALF) TO 1 (ONE) TABLET AT BEDTIME AS NEEDED for sleeplessness  Daytime somnolence  -     In-Center Sleep Study (Non-Sleep Provider Only); Future  Family history of sleep apnea  -     In-Center Sleep Study (Non-Sleep Provider Only); Future  Screening for cervical cancer  -     THINPREP PAP TEST  -     HPV DNA High Risk With Genotype  Visit for screening mammogram  -     BI mammo bilateral screening tomosynthesis; Future  Anosmia  -     Cancel: Referral to ENT; Future  -     Referral to ENT; Future  Hypercholesterolemia  -     Lipid panel; Future  Decreased hearing, unspecified laterality  -     Referral to ENT;  Future  Need for influenza vaccination  -     Flu vaccine (IIV4) age 6 months and greater, preservative free  Relationship with  has improved, she is working on how she reacts to things he says and he is working on things as well.an dthey had some counseling which helped.  See wrap up section for patient instructions and  additional treatment plan.  Please get back on low chol diet. Mediterranean diet.  Referral to sleep lab at [facility] for sleep study.  Referral to ENT for loss of smell: Tell them you prefer  Reta, and if that is not an option, then  prma such as dr Christiano Ngo or other.  When you restart metformin: take one per day first week then increase each week by one per day.  Topiramate: ask pharmacist if you can cut the tabs, take 50mg nightly for 2 weeks then 100mg. If not able to cut the tabs, call and will send a temporary supply of lower dose.  Mammogram due after 12-30-23.  Fasting lipid panel when back on meds, in 3-4 months.  Follow up 4 months.  Flu shot done today.  Updated covid booster suggested.  END LAST VISIT PLAN    HPI  Patient is a 64-year-old female who presents today for follow up hyperlipidemia, pre-diabetes, weight management and depression/anxiety.    Left Knee Lateral Torn Meniscus:  She reports it was a combination of things leading up to the injury.  It started with playing pickle ball, then swimming with her 9-month old grandson, and she backed into the ladder and twisted her knee trying to keep her grandson out of the water.  She states that it totally went out while going down the stairs after that. She has an MRI scheduled for today at [facility].  Advised to bring in a copy of MRI for us to scan into her chart as we do not usually get results from [facility].    Hyperlipidemia:  No myalgias, nausea, abdominal pain.  Meds: Taking regularly, no adverse effect.  Continues on Colestid 2 grams X2 daily.  Labs: on 07/03/2024; LDL was 130 mg/dL,  triglycerides were 168 mg/dL, non-HDL was 164 mg/dL, and total cholesterol was 213 mg/dL.  LDL goal: <70 mg/dL.  She tries to follow a Mediterranean diet.  Of note, she was on vacation prior to this blood draw and not following her diet.    Pre-diabetes:  Last A1c on 08/15/2022 was 5.8% and on 10/23/2023 it was 5.6%.  Glucose on 10/23/2023 was 105 mg/dL.  Continues on metformin 2000 mg daily.    Mood:  She reports feeling more down than usual likes she just wants to cry.  She states she misses the endorphins from being able to exercise or play pickle ball (3-5 times per week) due to her torn meniscus.  We discussed asking Dr. Valentine if she can ride a stationary bike.  She is taking Effexor 225 mg daily currently.  We discussed not increasing this but recommended trying CalmAid with possibility of adding BuSpar in the future if needed.  She reports improvement in her relationship with her .       We reviewed and updated patient's medication list.  Refills prescribed as required.    Mammogram ordered 11/01/2023, not completed.  Re-ordered today.    We discussed her recent blood work.  Reordered blood work to be completed end of October, 2024.    Follow up in November for Annual.    Review of Systems  See ROS in HPI    Current Outpatient Medications   Medication Instructions    cetirizine (ZYRTEC) 5 mg, oral, Daily PRN    cholecalciferol (Vitamin D-3) 25 MCG (1000 UT) capsule oral    colestipol (COLESTID) 2 g, oral, 2 times daily    hydrocortisone 2.5 % cream APPLY to rash on the left neck area TWO TO THREE TIMES DAILY for 2 WEEKS    metFORMIN XR (GLUCOPHAGE-XR) 2,000 mg, oral, Daily RT    thyroid (pork) (ARMOUR THYROID) 90 mg, oral, Daily    thyroid (pork) (ARMOUR THYROID) 15 mg, oral, 2 times weekly, Take by mouth.    topiramate (TOPAMAX) 100 mg, oral, Nightly    traZODone (Desyrel) 50 mg tablet TAKE 1/2 (ONE-HALF) TO 1 (ONE) TABLET AT BEDTIME AS NEEDED for sleeplessness    venlafaxine XR (EFFEXOR-XR) 225  "mg, oral, Daily     No Known Allergies  Objective  Results  reviewed:  Lab Results   Component Value Date    WBC 7.3 10/23/2023    HGB 13.9 10/23/2023    HCT 44.0 10/23/2023     10/23/2023    CHOL 213 (H) 07/03/2024    TRIG 168 (H) 07/03/2024    HDL 49.3 07/03/2024    LDLDIRECT 168 (H) 10/23/2023    ALT 18 10/23/2023    AST 16 10/23/2023     10/23/2023    K 4.4 10/23/2023     10/23/2023    CREATININE 0.92 10/23/2023    BUN 18 10/23/2023    CO2 26 10/23/2023    TSH 3.26 10/23/2023    GLUF 110 (H) 02/02/2022    HGBA1C 5.6 10/23/2023       Physical ExamBP 94/56   Pulse 74   Ht 1.6 m (5' 3\")   Wt 102 kg (224 lb)   SpO2 97%   BMI 39.68 kg/m²   Patient looks well and is in no obvious distress.  HEENT:   Normocephalic, no facial asymmetry  Eyes: Sclera and conjunctiva are clear.  Neck: No adenopathy cervical (Ant/post/lat), no Supraclavicular nodes.   Thyroid normal.  Carotid pulses normal, no carotid bruits.  Lungs : RR normal. Clear to auscultation anterior, posterior and lateral. No rales, wheezes rhonchi or rubs. Good air exchange.  Heart: RRR. No gallop, click or rub.  She has a 1-2/6 systolic murmur heard URSB; heard faintly ULSB.  No radiation. No diastolic component.  Of note, she had mild ALLY on echocardiogram.  Abdomen: Bowel sounds normal, No bruits. No pulsatile mass. No hepatosplenomegaly, masses or tenderness. Soft, no guarding.  Vascular:  Posterior tibialis and dorsalis pedis pulses within normal limits bilaterally.   Extremities: no upper extremity edema. No lower extremity edema.   Musculoskeletal: No synovitis of joints seen. No new deformity.TTP along left lateral knee joint line.  Effusion in left popliteal space.   Neuro: CN 2-12 intact. Alert, appropriate.  Ambulates independently.  No gross motor deficit.   No tremors.  Psych: normal mood and affect.  Skin: No rash, bruising petechiae or jaundice.    Karis was seen today for follow-up.  Diagnoses and all orders for this " visit:  Acute lateral meniscus tear of left knee, initial encounter (Primary)  Hypothyroidism due to Hashimoto's thyroiditis  -     TSH with reflex to Free T4 if abnormal; Future  B12 nutritional deficiency  -     Vitamin B12; Future  Vitamin D deficiency  -     Vitamin D 25-Hydroxy,Total (for eval of Vitamin D levels); Future  Hypercholesterolemia  -     Lipid Panel; Future  -     Cholesterol, LDL Direct; Future  IFG (impaired fasting glucose)  -     Hemoglobin A1C; Future  Visit for screening mammogram  -     BI mammo bilateral screening tomosynthesis; Future  Other depression  -     venlafaxine XR (Effexor-XR) 75 mg 24 hr capsule; Take 3 capsules (225 mg) by mouth once daily.  Insomnia associated with menopause  -     traZODone (Desyrel) 50 mg tablet; TAKE 1/2 (ONE-HALF) TO 1 (ONE) TABLET AT BEDTIME AS NEEDED for sleeplessness  Migraine without aura and with status migrainosus, not intractable  -     topiramate (Topamax) 100 mg tablet; Take 1 tablet (100 mg) by mouth once daily at bedtime.  Hypothyroidism, unspecified type  -     thyroid, pork, ([place] Thyroid) 90 mg tablet; Take 1 tablet (90 mg) by mouth once daily.  -     thyroid, pork, ([place] Thyroid) 30 mg tablet; Take 0.5 tablets (15 mg) by mouth 2 times a week. Take by mouth.  Pre-diabetes  -     metFORMIN  mg 24 hr tablet; Take 4 tablets (2,000 mg) by mouth once daily.  Hyperlipidemia, unspecified hyperlipidemia type  -     colestipol (Colestid) 1 gram tablet; Take 2 tablets (2 g) by mouth 2 times a day.  Blood tests for routine general physical examination  -     CBC and Auto Differential; Future  -     Lipid Panel; Future  -     Comprehensive Metabolic Panel; Future  -     TSH with reflex to Free T4 if abnormal; Future  -     Cholesterol, LDL Direct; Future         See patient instructions in wrap up plan, orders and comments for treatment plan.  Patient Instructions:  Calm aid by natures way one per day.  Ask dr Valentine for a copy of the MRI  report, when you see him.    We discussed adding buspirone temporarily for mood if needed, call.  Keep venlafaxine dose the same.    Cholesterol showed improvement despite the aberrant diet you had the few weeks before. Continue focusing on mediterranean style diet for cholesterol and weight.    Schedule annual for November.  The week before or late October Instructions for obtaining lab tests:   You do not need a paper requisition when obtaining tests at a  facility. No appointment is needed for lab tests.  For fasting blood tests:  Please do not consume calories for 10-12 hours prior to this blood test. It is ok to drink water, you should be hydrated.  Please do not take vitamins, supplements or thyroid medication before your blood is drawn this day.   Most lab results should be available for your review on the  My Chart portal within 48 hours. Please contact the office if you have not received results within 2 weeks of obtaining the test.    Schedule mammogram it is due.    Scribe Attestation  By signing my name below, Lary HOOD Scribe   attest that this documentation has been prepared under the direction and in the presence of Roseanne Correia MD.

## 2024-07-17 ENCOUNTER — APPOINTMENT (OUTPATIENT)
Dept: PRIMARY CARE | Facility: CLINIC | Age: 64
End: 2024-07-17
Payer: COMMERCIAL

## 2024-07-17 VITALS
SYSTOLIC BLOOD PRESSURE: 94 MMHG | HEART RATE: 74 BPM | BODY MASS INDEX: 39.69 KG/M2 | DIASTOLIC BLOOD PRESSURE: 56 MMHG | OXYGEN SATURATION: 97 % | WEIGHT: 224 LBS | HEIGHT: 63 IN

## 2024-07-17 DIAGNOSIS — R73.03 PRE-DIABETES: ICD-10-CM

## 2024-07-17 DIAGNOSIS — Z00.00 BLOOD TESTS FOR ROUTINE GENERAL PHYSICAL EXAMINATION: ICD-10-CM

## 2024-07-17 DIAGNOSIS — E03.8 HYPOTHYROIDISM DUE TO HASHIMOTO'S THYROIDITIS: ICD-10-CM

## 2024-07-17 DIAGNOSIS — E78.5 HYPERLIPIDEMIA, UNSPECIFIED HYPERLIPIDEMIA TYPE: ICD-10-CM

## 2024-07-17 DIAGNOSIS — N95.1 INSOMNIA ASSOCIATED WITH MENOPAUSE: ICD-10-CM

## 2024-07-17 DIAGNOSIS — E53.8 B12 NUTRITIONAL DEFICIENCY: ICD-10-CM

## 2024-07-17 DIAGNOSIS — S83.282A ACUTE LATERAL MENISCUS TEAR OF LEFT KNEE, INITIAL ENCOUNTER: Primary | ICD-10-CM

## 2024-07-17 DIAGNOSIS — E78.00 HYPERCHOLESTEROLEMIA: ICD-10-CM

## 2024-07-17 DIAGNOSIS — E03.9 HYPOTHYROIDISM, UNSPECIFIED TYPE: ICD-10-CM

## 2024-07-17 DIAGNOSIS — Z12.31 VISIT FOR SCREENING MAMMOGRAM: ICD-10-CM

## 2024-07-17 DIAGNOSIS — G43.001 MIGRAINE WITHOUT AURA AND WITH STATUS MIGRAINOSUS, NOT INTRACTABLE: ICD-10-CM

## 2024-07-17 DIAGNOSIS — E06.3 HYPOTHYROIDISM DUE TO HASHIMOTO'S THYROIDITIS: ICD-10-CM

## 2024-07-17 DIAGNOSIS — E55.9 VITAMIN D DEFICIENCY: ICD-10-CM

## 2024-07-17 DIAGNOSIS — R73.01 IFG (IMPAIRED FASTING GLUCOSE): ICD-10-CM

## 2024-07-17 DIAGNOSIS — F32.89 OTHER DEPRESSION: ICD-10-CM

## 2024-07-17 PROCEDURE — 3008F BODY MASS INDEX DOCD: CPT | Performed by: INTERNAL MEDICINE

## 2024-07-17 PROCEDURE — 99214 OFFICE O/P EST MOD 30 MIN: CPT | Performed by: INTERNAL MEDICINE

## 2024-07-17 RX ORDER — TRAZODONE HYDROCHLORIDE 50 MG/1
TABLET ORAL
Qty: 90 TABLET | Refills: 3 | Status: SHIPPED | OUTPATIENT
Start: 2024-07-17

## 2024-07-17 RX ORDER — THYROID 90 MG/1
90 TABLET ORAL DAILY
Qty: 90 TABLET | Refills: 3 | Status: SHIPPED | OUTPATIENT
Start: 2024-07-17

## 2024-07-17 RX ORDER — VENLAFAXINE HYDROCHLORIDE 75 MG/1
225 CAPSULE, EXTENDED RELEASE ORAL DAILY
Qty: 270 CAPSULE | Refills: 3 | Status: SHIPPED | OUTPATIENT
Start: 2024-07-17

## 2024-07-17 RX ORDER — METFORMIN HYDROCHLORIDE 500 MG/1
2000 TABLET, EXTENDED RELEASE ORAL
Qty: 360 TABLET | Refills: 3 | Status: SHIPPED | OUTPATIENT
Start: 2024-07-17

## 2024-07-17 RX ORDER — CETIRIZINE HYDROCHLORIDE 5 MG/1
5 TABLET, CHEWABLE ORAL DAILY PRN
COMMUNITY

## 2024-07-17 RX ORDER — THYROID 30 MG/1
15 TABLET ORAL 2 TIMES WEEKLY
Qty: 12 TABLET | Refills: 3 | Status: SHIPPED | OUTPATIENT
Start: 2024-07-18

## 2024-07-17 RX ORDER — TOPIRAMATE 100 MG/1
100 TABLET, FILM COATED ORAL NIGHTLY
Qty: 90 TABLET | Refills: 3 | Status: SHIPPED | OUTPATIENT
Start: 2024-07-17

## 2024-07-17 RX ORDER — MONTELUKAST SODIUM 4 MG/1
2 TABLET, CHEWABLE ORAL 2 TIMES DAILY
Qty: 180 TABLET | Refills: 3 | Status: SHIPPED | OUTPATIENT
Start: 2024-07-17 | End: 2025-07-17

## 2024-07-17 ASSESSMENT — PATIENT HEALTH QUESTIONNAIRE - PHQ9
2. FEELING DOWN, DEPRESSED OR HOPELESS: SEVERAL DAYS
SUM OF ALL RESPONSES TO PHQ9 QUESTIONS 1 AND 2: 2
1. LITTLE INTEREST OR PLEASURE IN DOING THINGS: SEVERAL DAYS
10. IF YOU CHECKED OFF ANY PROBLEMS, HOW DIFFICULT HAVE THESE PROBLEMS MADE IT FOR YOU TO DO YOUR WORK, TAKE CARE OF THINGS AT HOME, OR GET ALONG WITH OTHER PEOPLE: SOMEWHAT DIFFICULT

## 2024-07-17 ASSESSMENT — PAIN SCALES - GENERAL: PAINLEVEL: 0-NO PAIN

## 2024-07-17 NOTE — PATIENT INSTRUCTIONS
Calm aid by natures way one per day.  Ask dr Valentine for a copy of the MRI report, when you see him.    We discussed adding buspirone temporarily for mood if needed, call.  Keep venlafaxine dose the same.    Cholesterol showed improvement despite the aberrant diet you had the few weeks before. Continue focusing on mediterranean style diet for cholesterol and weight.    Schedule annual for November.  The week before or late October Instructions for obtaining lab tests:   You do not need a paper requisition when obtaining tests at a  facility. No appointment is needed for lab tests.  For fasting blood tests:  Please do not consume calories for 10-12 hours prior to this blood test. It is ok to drink water, you should be hydrated.  Please do not take vitamins, supplements or thyroid medication before your blood is drawn this day.   Most lab results should be available for your review on the  My Chart portal within 48 hours. Please contact the office if you have not received results within 2 weeks of obtaining the test.    Schedule mammogram it is due.

## 2024-08-05 DIAGNOSIS — E03.9 HYPOTHYROIDISM, UNSPECIFIED TYPE: ICD-10-CM

## 2024-08-05 RX ORDER — THYROID 30 MG/1
15 TABLET ORAL 2 TIMES WEEKLY
Qty: 12 TABLET | Refills: 0 | Status: SHIPPED | OUTPATIENT
Start: 2024-08-05

## 2024-08-05 RX ORDER — THYROID 90 MG/1
90 TABLET ORAL DAILY
Qty: 90 TABLET | Refills: 0 | Status: SHIPPED | OUTPATIENT
Start: 2024-08-05

## 2024-08-05 NOTE — TELEPHONE ENCOUNTER
Karis requesting the thyroid medication be sent to another pharmacy because [facility] doesn't cover medication.    Please refill.    thyroid, pork, ([place] Thyroid) 90 mg tablet   90 mg, Daily     thyroid, pork, ([place] Thyroid) 30 mg tablet   15 mg, 2 times weekly     [facility]

## 2024-10-08 ENCOUNTER — HOSPITAL ENCOUNTER (OUTPATIENT)
Dept: RADIOLOGY | Facility: CLINIC | Age: 64
Discharge: HOME | End: 2024-10-08
Payer: COMMERCIAL

## 2024-10-08 VITALS — WEIGHT: 230 LBS | BODY MASS INDEX: 39.27 KG/M2 | HEIGHT: 64 IN

## 2024-10-08 DIAGNOSIS — Z12.31 VISIT FOR SCREENING MAMMOGRAM: ICD-10-CM

## 2024-10-08 PROCEDURE — 77067 SCR MAMMO BI INCL CAD: CPT | Performed by: RADIOLOGY

## 2024-10-08 PROCEDURE — 77063 BREAST TOMOSYNTHESIS BI: CPT | Performed by: RADIOLOGY

## 2024-10-08 PROCEDURE — 77067 SCR MAMMO BI INCL CAD: CPT

## 2024-10-13 DIAGNOSIS — Z12.31 VISIT FOR SCREENING MAMMOGRAM: Primary | ICD-10-CM

## 2024-10-27 NOTE — PROGRESS NOTES
Subjective   Patient ID: Karis Adrian is a 64 y.o. female who presents for Annual Physical and follow up on conditions.    LAST VISIT PLAN FROM: 07/17/2024:  Patient Instructions:  Calm aid by natures way one per day.  Ask dr Valentine for a copy of the MRI report, when you see him.  We discussed adding buspirone temporarily for mood if needed, call.  Keep venlafaxine dose the same.  Cholesterol showed improvement despite the aberrant diet you had the few weeks before. Continue focusing on mediterranean style diet for cholesterol and weight.  Schedule annual for November.  The week before or late October Instructions for obtaining lab tests:   You do not need a paper requisition when obtaining tests at a  facility. No appointment is needed for lab tests.  For fasting blood tests:  Please do not consume calories for 10-12 hours prior to this blood test. It is ok to drink water, you should be hydrated.  Please do not take vitamins, supplements or thyroid medication before your blood is drawn this day.   Most lab results should be available for your review on the  My Chart portal within 48 hours. Please contact the office if you have not received results within 2 weeks of obtaining the test.  Schedule mammogram it is due.  END INFO FROM PRIOR VISIT  -------------------------------------------  HPI  Health maintenance items last completed:  Colonoscopy: 09/22/2022, 3 mm and 5 mm polyps (no pathology results found), diffuse diverticulosis, small internal hemorrhoids, extremely and diffusely tortuous colon.   Mammogram: 10/08/2024; No mammographic evidence of malignancy. Independent clinical evaluation of the reported skin lesion recommended.  BI-RADS Category:  1 Negative. Recommendation:  Clinical Follow-up and Continued Annual Screening. Due 10/08/2025.  Bone Density: No record of bone density to date.   Urine albumin if HTN or DM2: N/A.  Pap: 11/01/2023; negative, HPV negative.  Pelvic: 11/01/2023.  Breast exam in  office: Today.  Vaccines due or not completed: Zoster completed.  T-dap due 05/30/2029.  Covid booster and influenza due.  Patient received Covid booster 11/01/2024 and influenza vaccine 09/25/2024.  Last Health Maintenance exam: 11/01/2023    Since last visit, patient underwent mammogram 10/08/2024 (See Objective).    Blood Work Results:  We discussed her recent blood work completed on 10/29/2024 which was WNL except for elevated LDL at 133 mg/dL with triglycerides at 161 mg/dL but improved from previous check.  She also has elevated TSH.  Of note, she continues on [place] Thyroid 90 mg daily plus an extra 15 mg two times per week.  We discussed her B12 level being elevated at 918, but I am not concerned with this level at this time.    Health Maintenance Items:  We discussed health maintenance items that are up to date.    Hypercholesterolemia:  Last LDL direct was 133 mg/dL with triglycerides at 161 mg/dL on 10/29/2024.  Patient continues on Colestid 2 grams X2 daily.  We discussed doing a CT cardiac scoring.  She had a CT scan in 2015 which did not show any heart plaque.  Order placed for CT cardiac scoring.    Discussed with the patient the pros and cons of doing a CT for cardiac calcium scoring. Pros are to look for possible plaque, and this picks up calcified plaque, but not the less common non-calcified plaque. It can breed other tests if lung nodules or liver nodules are found and need further evaluation. This test is not covered by insurance but any testing needed for findings noted on the CT Cardiac would be billable to insurance. Radiation is low dose, there is no contrast. It can  other helpful findings such as thoracic aneurysm.     Daytime Somnolence:  Patient reports wanting to nap often and will fall asleep easily during the day if bored.  She reports that she does snore, as per her .  She states, he is concerned about her sleepiness.  She has not gone for her sleep study as her  insurance declined it.  We discussed referral to sleep medicine.  Order placed.    Mood:  Stable.  Patient reports, since she started back with exercise, her mood has improved.    Patient inquired about Ozempic for weight loss.  We will revisit at next visit in 3 months.    Social History:  Patient reports her daughter, age 26, has been diagnosed with a pulmonary embolism and is now taking Eliquis for 3 months.    We discussed recommended vaccines including Prevnar 20 (after she turns 65 in February 2025), Covid booster and influenza vaccine. Patient received Covid booster 11/01/2024 and influenza vaccine 09/25/2024.    Order placed for blood work as required (repeat TSH in 3 months).    Follow up 03/11/2025.    Review of Systems  All systems reviewed and are negative unless otherwise stated in HPI or noted in writing on the written   3  page history and review of systems document reviewed by me and  scanned in with this visit. This is scanned either to notes or to media, with today's date.    Current Outpatient Medications   Medication Instructions    cetirizine (ZYRTEC) 5 mg, Daily PRN    cholecalciferol (Vitamin D-3) 25 MCG (1000 UT) capsule Take by mouth.    colestipol (COLESTID) 2 g, oral, 2 times daily    hydrocortisone 2.5 % cream APPLY to rash on the left neck area TWO TO THREE TIMES DAILY for 2 WEEKS    metFORMIN XR (GLUCOPHAGE-XR) 2,000 mg, oral, Daily RT    thyroid (pork) (ARMOUR THYROID) 15 mg, oral, 2 times weekly, Take by mouth.    thyroid (pork) (ARMOUR THYROID) 90 mg, oral, Daily    topiramate (TOPAMAX) 100 mg, oral, Nightly    traZODone (Desyrel) 50 mg tablet TAKE 1/2 (ONE-HALF) TO 1 (ONE) TABLET AT BEDTIME AS NEEDED for sleeplessness    venlafaxine XR (EFFEXOR-XR) 225 mg, oral, Daily     No Known Allergies  Objective   Results reviewed:  Mammogram 10/08/2024:  IMPRESSION:  No mammographic evidence of malignancy. Independent clinical  evaluation of the reported skin lesion recommended.  BI-RADS  CATEGORY:  BI-RADS Category:  1 Negative.  Recommendation:  Clinical Follow-up and Continued Annual Screening.  Recommended Date:  1 Year.  Laterality:  Bilateral.    Latest Complete Lab Results:  CBC:   Lab Results   Component Value Date    WBC 6.9 10/29/2024    RBC 4.48 10/29/2024    HGB 13.4 10/29/2024    HCT 41.9 10/29/2024    MCV 94 10/29/2024    MCH 29.9 10/29/2024    MCHC 32.0 10/29/2024     10/29/2024    MPV 11.1 10/23/2023       CBC w/Diff:   Lab Results   Component Value Date    WBC 6.9 10/29/2024    NRBC 0.0 10/29/2024    RBC 4.48 10/29/2024    HGB 13.4 10/29/2024    HCT 41.9 10/29/2024    MCV 94 10/29/2024    MCHC 32.0 10/29/2024     10/29/2024    RDW 13.3 10/29/2024    NEUTOPHILPCT 64.4 10/29/2024    IGPCT 0.4 10/29/2024    LYMPHOPCT 22.1 10/29/2024    MONOPCT 8.7 10/29/2024    EOSPCT 3.8 10/29/2024    BASOPCT 0.6 10/29/2024    NEUTROABS 4.44 10/29/2024    LYMPHSABS 1.52 10/29/2024    MONOSABS 0.60 10/29/2024    EOSABS 0.26 10/29/2024    BASOSABS 0.04 10/29/2024        CMP:  Lab Results   Component Value Date    GLUCOSE 94 10/29/2024     10/29/2024    K 4.5 10/29/2024     10/29/2024    CO2 26 10/29/2024    ANIONGAP 14 10/29/2024    BUN 19 10/29/2024    CREATININE 0.98 10/29/2024    GFRF 67 08/15/2022    CALCIUM 9.2 10/29/2024    ALBUMIN 4.3 10/29/2024    ALKPHOS 85 10/29/2024    PROT 6.5 10/29/2024    AST 19 10/29/2024    BILITOT 0.6 10/29/2024    ALT 34 10/29/2024        BMP:  Lab Results   Component Value Date    GLUCOSE 94 10/29/2024    BUN 19 10/29/2024    CREATININE 0.98 10/29/2024     10/29/2024    K 4.5 10/29/2024     10/29/2024    CO2 26 10/29/2024    ANIONGAP 14 10/29/2024    CALCIUM 9.2 10/29/2024    EGFR 65 10/29/2024        Lipid:   Lab Results   Component Value Date    CHOL 188 10/29/2024    HDL 45.5 10/29/2024    CHHDL 4.1 10/29/2024    LDLF 123 (H) 08/15/2022    VLDL 32 10/29/2024    TRIG 161 (H) 10/29/2024       LDL Direct:  Lab Results   Component  "Value Date    LDLDIRECT 133 (H) 10/29/2024        TSH:   Lab Results   Component Value Date    TSH 4.01 (H) 10/29/2024        B12:  Lab Results   Component Value Date    VAGAUBII83 918 (H) 10/29/2024       Vitamin D:  Lab Results   Component Value Date    VITD25 39 10/29/2024        HgA1c:   Lab Results   Component Value Date    HGBA1C 5.5 10/29/2024    UIMFXLMH2H 111 10/29/2024       Physical Exam      12/27/2022    10:20 AM 5/8/2023     1:43 PM 11/1/2023    11:32 AM 11/1/2023     1:28 PM 7/17/2024    10:31 AM 10/8/2024     8:19 AM 11/4/2024    12:28 PM   Vitals   Systolic 104 116 122  94  108   Diastolic 66 66 70  56  60   Heart Rate 64 75 64  74  72   Resp 16 16 18    18   Height (in)  1.626 m (5' 4\") 1.6 m (5' 3\")  1.6 m (5' 3\") 1.626 m (5' 4\") 1.6 m (5' 3\")   Weight (lb) 216 210 202 220 224 230 232   BMI 38.26 kg/m2 36.05 kg/m2 35.78 kg/m2 38.97 kg/m2 39.68 kg/m2 39.48 kg/m2 41.1 kg/m2   BSA (m2) 2.09 m2 2.07 m2 2.02 m2 2.11 m2 2.13 m2 2.17 m2 2.16 m2   Visit Report  Report Report Report Report  Report     General: Patient is known to me, looks well, is in usual state of health, with  no obvious distress.  Head: Normocephalic  Eyes: PERRL, EOMI, no scleral icterus or conjunctival abnormality.  Ears: Normal canals and TM's  Oropharynx: Well hydrated mucosa. no lesions, erythema. palate moves well.  Neck: No masses, no cervical (A/P/ or Lateral) adenopathy. Thyroid not enlarged and no nodules. Carotid pulses normal and no bruits.  Chest: Normal AP diameter. No supraclavicular adenopathy.  Lungs: Clear to auscultation: no rales , wheezes, rhonchi, rubs, examined bilaterally, ant/post /lateral. RR normal.  Heart: RRR. No GCR S1 S2 normal.  Patient has a 2/6 systolic murmur heard URSB which does not radiate.  Abdomen: BS normal, no bruits. No hepatosplenomegaly. No abdominal mass or tenderness. No distension.  Extremities: No upper extremity edema. No lower leg edema.  No ischemia.   Joints: No synovitis seen. No " deformities.  Pulses: Normal posterior tibialis pulses, normal dorsalis pedis pulses. normal radial pulses. Normal femoral pulses without bruits.  Neuro: CN 2-12 intact . Alert, oriented, appropriate.  No focal weakness observed. No tremors. Ambulation is normal .  Psych: Mood and affect normal. No cognitive deficits noted during this exam. Alert, oriented, appropriate.  Skin: No rash, petechiae or excessive bruising.  Lymph: No SC axillary or inguinal adenopathy  Breast Exam : Symmetric appearance. No masses, nipple discharge, skin retraction, axillary or supraclavicular adenopathy.  Patient has a skin cyst on the left upper breast at the 2-3 o'clock position.  It is a linear line measuring 5 mm X 1 mm.  It is not in the breast tissue.  Patient states it occurred 5-6 weeks ago, and she thought it was a bug bite.  Advised to monitor and if not resolved in 1 month, recommended having her dermatologist evaluate.    Karis was seen today for annual exam.  Diagnoses and all orders for this visit:  Encounter for annual physical exam (Primary)  Wakes up during night  -     Referral to Adult Sleep Medicine; Future  Daytime somnolence  -     Referral to Adult Sleep Medicine; Future  Family history of sleep apnea  -     Referral to Adult Sleep Medicine; Future  Hypercholesterolemia  -     CT cardiac scoring wo IV contrast; Future  Hypothyroidism due to Hashimoto thyroiditis  -     TSH; Future  Class 3 severe obesity with serious comorbidity in adult, unspecified BMI, unspecified obesity type  Abnormal thyroid function test  -     TSH; Future  Aortic valve insufficiency, acquired  Systolic ejection murmur  B12 nutritional deficiency  IFG (impaired fasting glucose)  Skin cyst        See patient instructions in wrap up plan, orders and comments for treatment plan.  Patient Instructions:  If skin lesion left upper outer breast does not resolve in a month, have your dermatologist take a look at it.  Schedule CT scan coronary  artery calcium scoring.  Prevnar 20 after you turn 65.    Follow up 4 months on meds    Follow up thyroid blood test 3 months.    We can discuss GLP 1 at that time.   Scribe Attestation  By signing my name below, ILary Scribe   attest that this documentation has been prepared under the direction and in the presence of Roseanne Correia MD.

## 2024-10-29 ENCOUNTER — LAB (OUTPATIENT)
Dept: LAB | Facility: LAB | Age: 64
End: 2024-10-29
Payer: COMMERCIAL

## 2024-10-29 DIAGNOSIS — E78.00 HYPERCHOLESTEROLEMIA: ICD-10-CM

## 2024-10-29 DIAGNOSIS — R73.01 IFG (IMPAIRED FASTING GLUCOSE): ICD-10-CM

## 2024-10-29 DIAGNOSIS — E53.8 B12 NUTRITIONAL DEFICIENCY: ICD-10-CM

## 2024-10-29 DIAGNOSIS — Z00.00 BLOOD TESTS FOR ROUTINE GENERAL PHYSICAL EXAMINATION: ICD-10-CM

## 2024-10-29 DIAGNOSIS — E55.9 VITAMIN D DEFICIENCY: ICD-10-CM

## 2024-10-29 DIAGNOSIS — E06.3 HYPOTHYROIDISM DUE TO HASHIMOTO'S THYROIDITIS: ICD-10-CM

## 2024-10-29 LAB
25(OH)D3 SERPL-MCNC: 39 NG/ML (ref 30–100)
ALBUMIN SERPL BCP-MCNC: 4.3 G/DL (ref 3.4–5)
ALP SERPL-CCNC: 85 U/L (ref 33–136)
ALT SERPL W P-5'-P-CCNC: 34 U/L (ref 7–45)
ANION GAP SERPL CALC-SCNC: 14 MMOL/L (ref 10–20)
AST SERPL W P-5'-P-CCNC: 19 U/L (ref 9–39)
BASOPHILS # BLD AUTO: 0.04 X10*3/UL (ref 0–0.1)
BASOPHILS NFR BLD AUTO: 0.6 %
BILIRUB SERPL-MCNC: 0.6 MG/DL (ref 0–1.2)
BUN SERPL-MCNC: 19 MG/DL (ref 6–23)
CALCIUM SERPL-MCNC: 9.2 MG/DL (ref 8.6–10.6)
CHLORIDE SERPL-SCNC: 107 MMOL/L (ref 98–107)
CHOLEST SERPL-MCNC: 188 MG/DL (ref 0–199)
CHOLESTEROL/HDL RATIO: 4.1
CO2 SERPL-SCNC: 26 MMOL/L (ref 21–32)
CREAT SERPL-MCNC: 0.98 MG/DL (ref 0.5–1.05)
EGFRCR SERPLBLD CKD-EPI 2021: 65 ML/MIN/1.73M*2
EOSINOPHIL # BLD AUTO: 0.26 X10*3/UL (ref 0–0.7)
EOSINOPHIL NFR BLD AUTO: 3.8 %
ERYTHROCYTE [DISTWIDTH] IN BLOOD BY AUTOMATED COUNT: 13.3 % (ref 11.5–14.5)
EST. AVERAGE GLUCOSE BLD GHB EST-MCNC: 111 MG/DL
GLUCOSE SERPL-MCNC: 94 MG/DL (ref 74–99)
HBA1C MFR BLD: 5.5 %
HCT VFR BLD AUTO: 41.9 % (ref 36–46)
HDLC SERPL-MCNC: 45.5 MG/DL
HGB BLD-MCNC: 13.4 G/DL (ref 12–16)
IMM GRANULOCYTES # BLD AUTO: 0.03 X10*3/UL (ref 0–0.7)
IMM GRANULOCYTES NFR BLD AUTO: 0.4 % (ref 0–0.9)
LDLC SERPL CALC-MCNC: 110 MG/DL
LDLC SERPL DIRECT ASSAY-MCNC: 133 MG/DL (ref 0–129)
LYMPHOCYTES # BLD AUTO: 1.52 X10*3/UL (ref 1.2–4.8)
LYMPHOCYTES NFR BLD AUTO: 22.1 %
MCH RBC QN AUTO: 29.9 PG (ref 26–34)
MCHC RBC AUTO-ENTMCNC: 32 G/DL (ref 32–36)
MCV RBC AUTO: 94 FL (ref 80–100)
MONOCYTES # BLD AUTO: 0.6 X10*3/UL (ref 0.1–1)
MONOCYTES NFR BLD AUTO: 8.7 %
NEUTROPHILS # BLD AUTO: 4.44 X10*3/UL (ref 1.2–7.7)
NEUTROPHILS NFR BLD AUTO: 64.4 %
NON HDL CHOLESTEROL: 143 MG/DL (ref 0–149)
NRBC BLD-RTO: 0 /100 WBCS (ref 0–0)
PLATELET # BLD AUTO: 231 X10*3/UL (ref 150–450)
POTASSIUM SERPL-SCNC: 4.5 MMOL/L (ref 3.5–5.3)
PROT SERPL-MCNC: 6.5 G/DL (ref 6.4–8.2)
RBC # BLD AUTO: 4.48 X10*6/UL (ref 4–5.2)
SODIUM SERPL-SCNC: 142 MMOL/L (ref 136–145)
T4 FREE SERPL-MCNC: 0.62 NG/DL (ref 0.78–1.48)
TRIGL SERPL-MCNC: 161 MG/DL (ref 0–149)
TSH SERPL-ACNC: 4.01 MIU/L (ref 0.44–3.98)
VIT B12 SERPL-MCNC: 918 PG/ML (ref 211–911)
VLDL: 32 MG/DL (ref 0–40)
WBC # BLD AUTO: 6.9 X10*3/UL (ref 4.4–11.3)

## 2024-10-29 PROCEDURE — 36415 COLL VENOUS BLD VENIPUNCTURE: CPT

## 2024-10-29 PROCEDURE — 83721 ASSAY OF BLOOD LIPOPROTEIN: CPT

## 2024-10-29 PROCEDURE — 80061 LIPID PANEL: CPT

## 2024-10-29 PROCEDURE — 82306 VITAMIN D 25 HYDROXY: CPT

## 2024-10-29 PROCEDURE — 85025 COMPLETE CBC W/AUTO DIFF WBC: CPT

## 2024-10-29 PROCEDURE — 80053 COMPREHEN METABOLIC PANEL: CPT

## 2024-10-29 PROCEDURE — 84443 ASSAY THYROID STIM HORMONE: CPT

## 2024-10-29 PROCEDURE — 84439 ASSAY OF FREE THYROXINE: CPT

## 2024-10-29 PROCEDURE — 83036 HEMOGLOBIN GLYCOSYLATED A1C: CPT

## 2024-10-29 PROCEDURE — 82607 VITAMIN B-12: CPT

## 2024-11-04 ENCOUNTER — APPOINTMENT (OUTPATIENT)
Dept: PRIMARY CARE | Facility: CLINIC | Age: 64
End: 2024-11-04
Payer: COMMERCIAL

## 2024-11-04 VITALS
WEIGHT: 232 LBS | DIASTOLIC BLOOD PRESSURE: 60 MMHG | SYSTOLIC BLOOD PRESSURE: 108 MMHG | RESPIRATION RATE: 18 BRPM | BODY MASS INDEX: 41.11 KG/M2 | HEART RATE: 72 BPM | HEIGHT: 63 IN

## 2024-11-04 DIAGNOSIS — R40.0 DAYTIME SOMNOLENCE: ICD-10-CM

## 2024-11-04 DIAGNOSIS — R94.6 ABNORMAL THYROID FUNCTION TEST: ICD-10-CM

## 2024-11-04 DIAGNOSIS — R01.1 SYSTOLIC EJECTION MURMUR: ICD-10-CM

## 2024-11-04 DIAGNOSIS — I35.1 AORTIC VALVE INSUFFICIENCY, ACQUIRED: ICD-10-CM

## 2024-11-04 DIAGNOSIS — E06.3 HYPOTHYROIDISM DUE TO HASHIMOTO THYROIDITIS: ICD-10-CM

## 2024-11-04 DIAGNOSIS — R73.01 IFG (IMPAIRED FASTING GLUCOSE): ICD-10-CM

## 2024-11-04 DIAGNOSIS — E53.8 B12 NUTRITIONAL DEFICIENCY: ICD-10-CM

## 2024-11-04 DIAGNOSIS — Z00.00 ENCOUNTER FOR ANNUAL PHYSICAL EXAM: Primary | ICD-10-CM

## 2024-11-04 DIAGNOSIS — E66.813 CLASS 3 SEVERE OBESITY WITH SERIOUS COMORBIDITY IN ADULT, UNSPECIFIED BMI, UNSPECIFIED OBESITY TYPE: ICD-10-CM

## 2024-11-04 DIAGNOSIS — G47.8 WAKES UP DURING NIGHT: ICD-10-CM

## 2024-11-04 DIAGNOSIS — L72.9 SKIN CYST: ICD-10-CM

## 2024-11-04 DIAGNOSIS — E78.00 HYPERCHOLESTEROLEMIA: ICD-10-CM

## 2024-11-04 DIAGNOSIS — E66.01 CLASS 3 SEVERE OBESITY WITH SERIOUS COMORBIDITY IN ADULT, UNSPECIFIED BMI, UNSPECIFIED OBESITY TYPE: ICD-10-CM

## 2024-11-04 DIAGNOSIS — Z82.0 FAMILY HISTORY OF SLEEP APNEA: ICD-10-CM

## 2024-11-04 PROCEDURE — 3008F BODY MASS INDEX DOCD: CPT | Performed by: INTERNAL MEDICINE

## 2024-11-04 PROCEDURE — 99396 PREV VISIT EST AGE 40-64: CPT | Performed by: INTERNAL MEDICINE

## 2024-11-04 ASSESSMENT — PROMIS GLOBAL HEALTH SCALE
RATE_SOCIAL_SATISFACTION: VERY GOOD
EMOTIONAL_PROBLEMS: RARELY
CARRYOUT_SOCIAL_ACTIVITIES: VERY GOOD
RATE_GENERAL_HEALTH: VERY GOOD
RATE_AVERAGE_PAIN: 0
RATE_MENTAL_HEALTH: VERY GOOD
RATE_QUALITY_OF_LIFE: VERY GOOD
RATE_PHYSICAL_HEALTH: VERY GOOD
CARRYOUT_PHYSICAL_ACTIVITIES: MOSTLY
RATE_AVERAGE_FATIGUE: MILD

## 2024-11-04 ASSESSMENT — ANXIETY QUESTIONNAIRES
6. BECOMING EASILY ANNOYED OR IRRITABLE: NOT AT ALL
1. FEELING NERVOUS, ANXIOUS, OR ON EDGE: NOT AT ALL
GAD7 TOTAL SCORE: 0
2. NOT BEING ABLE TO STOP OR CONTROL WORRYING: NOT AT ALL
7. FEELING AFRAID AS IF SOMETHING AWFUL MIGHT HAPPEN: NOT AT ALL
3. WORRYING TOO MUCH ABOUT DIFFERENT THINGS: NOT AT ALL
5. BEING SO RESTLESS THAT IT IS HARD TO SIT STILL: NOT AT ALL
IF YOU CHECKED OFF ANY PROBLEMS ON THIS QUESTIONNAIRE, HOW DIFFICULT HAVE THESE PROBLEMS MADE IT FOR YOU TO DO YOUR WORK, TAKE CARE OF THINGS AT HOME, OR GET ALONG WITH OTHER PEOPLE: NOT DIFFICULT AT ALL
4. TROUBLE RELAXING: NOT AT ALL

## 2024-11-04 ASSESSMENT — PATIENT HEALTH QUESTIONNAIRE - PHQ9
1. LITTLE INTEREST OR PLEASURE IN DOING THINGS: NOT AT ALL
SUM OF ALL RESPONSES TO PHQ9 QUESTIONS 1 AND 2: 0
2. FEELING DOWN, DEPRESSED OR HOPELESS: NOT AT ALL

## 2024-11-04 ASSESSMENT — COLUMBIA-SUICIDE SEVERITY RATING SCALE - C-SSRS
2. HAVE YOU ACTUALLY HAD ANY THOUGHTS OF KILLING YOURSELF?: NO
1. IN THE PAST MONTH, HAVE YOU WISHED YOU WERE DEAD OR WISHED YOU COULD GO TO SLEEP AND NOT WAKE UP?: NO
6. HAVE YOU EVER DONE ANYTHING, STARTED TO DO ANYTHING, OR PREPARED TO DO ANYTHING TO END YOUR LIFE?: NO

## 2024-11-04 NOTE — PATIENT INSTRUCTIONS
If skin lesion left upper outer breast does not resolve in a month, have your dermatologist take a look at it.  Schedule CT scan coronary artery calcium scoring.  Prevnar 20 after you turn 65.    Follow up 4 months on meds    Follow up thyroid blood test 3 months.    We can discuss GLP 1 at that time.

## 2024-11-18 DIAGNOSIS — E03.9 HYPOTHYROIDISM, UNSPECIFIED TYPE: ICD-10-CM

## 2024-11-18 RX ORDER — THYROID 90 MG/1
90 TABLET ORAL DAILY
Qty: 90 TABLET | Refills: 1 | Status: SHIPPED | OUTPATIENT
Start: 2024-11-18

## 2024-11-18 RX ORDER — THYROID 30 MG/1
15 TABLET ORAL 2 TIMES WEEKLY
Qty: 12 TABLET | Refills: 1 | Status: SHIPPED | OUTPATIENT
Start: 2024-11-18

## 2024-11-21 DIAGNOSIS — E03.9 HYPOTHYROIDISM, UNSPECIFIED TYPE: ICD-10-CM

## 2024-11-21 NOTE — TELEPHONE ENCOUNTER
Can wait for Monday    [facility] medication anymore would like it sent to [facility] in [facility]    Would like *NORMA* marked also because she has coupon for medication     thyroid, pork, ([place] Thyroid) 90 mg tablet   90 mg, Daily     thyroid, pork, ([place] Thyroid) 30 mg tablet   15 mg, 2 times weekly   [facility]

## 2024-11-25 RX ORDER — THYROID, PORCINE 30 MG/1
15 TABLET ORAL 2 TIMES WEEKLY
Qty: 12 TABLET | Refills: 1 | Status: SHIPPED | OUTPATIENT
Start: 2024-11-25

## 2024-11-25 RX ORDER — THYROID, PORCINE 90 MG/1
90 TABLET ORAL DAILY
Qty: 90 TABLET | Refills: 1 | Status: SHIPPED | OUTPATIENT
Start: 2024-11-25

## 2025-02-24 NOTE — PROGRESS NOTES
Patient ID: Karis Adrian is a 65 y.o. female who presents for Follow-up (Pt here for follow up on TSH. Labs were done. Pt's daughter was recently dx with Factor V Leiden and the family was told to get tested. Pt also wanted her refill for thyroid meds, but wanted the NORMA taken off of the Rx because of cost. She will get the generic because it is cheaper. )    (LAST VISIT PLAN FROM: 11/04/2024:  Patient Instructions:  If skin lesion left upper outer breast does not resolve in a month, have your dermatologist take a look at it.  Schedule CT scan coronary artery calcium scoring.  Prevnar 20 after you turn 65.  Follow up 4 months on meds  Follow up thyroid blood test 3 months.  We can discuss GLP 1 at that time.   END LAST VISIT PLAN)  -------------------------------------------  HPI  Patient is a 65-year-old female who presents today for follow up.    Since last visit, a mammogram and CT Cardiac Scoring has been ordered which have not been completed.  Patient will complete.    Discussed with the patient the pros and cons of doing a CT for cardiac calcium scoring. Pros are to look for possible plaque, and this picks up calcified plaque, but not the less common non-calcified plaque. It can breed other tests if lung nodules or liver nodules are found and need further evaluation. This test is not covered by insurance but any testing needed for findings noted on the CT Cardiac would be billable to insurance. Radiation is low dose, there is no contrast. It can  other helpful findings such as thoracic aneurysm.    Patient reports her daughter was recently diagnosed with Factor V Leiden, and the family was told to get tested. Order placed. Cost seems high, she will check with insurance about coverage. She has other children and would like to know if they need evaluated.    We discussed recommended vaccines including Prevnar 20 or Prevnar 21, (RSV optional until age 75). Recommended tetanus booster if she happens to get  a puncture wound, as she is past 5 years since her last tetanus vaccine.otherwise 10 years from last.    Patient has never had a bone density.  Order placed to be completed prior to next visit.     Patient reports having an upcoming skin cancer screening completed in March at [facility].    BP today is 118/54.  Follow up on cardiovascular conditions:  Cardiac:   Chest pain?No  Palpitations? No  Increased clemons?  No  Other:  Resp:   Shortness of breath?No  Wheezing No  Cough No  Other:  Extremities:   Leg or ankle swelling?No   Claudication?No  Other:  Neuro:  DizzinessNo  SyncopeNo   Blurred visionNo  New headaches No  Other:  Medications:Taking them regularly.Yes  Side effects.No    Hypercholesterolemia:  No myalgias, nausea, abdominal pain.  Meds: Taking regularly, no adverse effect. Patient continues on Colestid 2 grams twice daily.  Labs:  Last LDL direct was 133 mg/dL with triglycerides at 161 mg/dL on 10/29/2024.   LDL goal: <100 mg/dL, unless patient has plaque, than goal would be less than 70 mg/dL.  CT Cardiac Scoring to be completed.    Hypothyroidism:  Patient continues on Conrad Thyroid 90 mg daily with an extra 15 mg twice weekly.  Last TSH was 3.00 on on 03/10/2025.  Patient would like the NORMA taken off her prescription due to cost and would like to try the generic version.  Completed.  We discussed that she will require blood work a few months after she switches to the generic medication.  We discussed the risks of taken generic Conrad versus taking Synthroid or levothyroxine including improper absorption or possible arrhythmias.  We discussed waiting until Fall for repeat TSH.  Recommended monitoring the  and keeping track in case symptomatic with manufacturing change.    Anxiety:  Depression:  Stable.  Patient continues on Effexor  mg daily.  Patient reports that her daughter commented to her that she has done very well this winter.  Continue same dose  Migraine  Headaches:  Stable.  Patient continues on Topamax 100 mg nightly. This is managing her migraines well.    Insomnia:  Patient continues on trazodone 50 mg , 1/2 to 1 tablet PRN insomnia.  Patient has a sleep medicine appointment scheduled for 03/25/2025.    Patient continues on metformin 2000 mg daily.  A1c was 5.5% on 10/29/2024.  We discussed that research has shown that exercising for 15 minutes within the hour after eating can be beneficial.  She reports that she does not have insurance coverage for GLP-1 medication.  Will repeat A1c with Annual blood work.  Recommended checking with [employer] for alternative GLP-1 medications that may be covered.    Orders placed for prescriptions as required.    Orders placed for blood work as required to be completed 2 weeks prior to follow up appointment.    Follow up 09/23/2025 for Annual Medicare Wellness Visit.    Review of Systems  See ROS in HPI    Current Outpatient Medications   Medication Instructions    cetirizine (ZYRTEC) 5 mg, Daily PRN    cholecalciferol (Vitamin D-3) 25 MCG (1000 UT) capsule Take by mouth.    colestipol (COLESTID) 2 g, oral, 2 times daily    hydrocortisone 2.5 % cream APPLY to rash on the left neck area TWO TO THREE TIMES DAILY for 2 WEEKS    metFORMIN XR (GLUCOPHAGE-XR) 2,000 mg, oral, Daily RT    thyroid (pork) (ARMOUR THYROID) 90 mg, oral, Daily    [START ON 3/13/2025] thyroid (pork) (ARMOUR THYROID) 15 mg, oral, 2 times weekly, Take by mouth.    topiramate (TOPAMAX) 100 mg, oral, Nightly    traZODone (Desyrel) 50 mg tablet TAKE 1/2 (ONE-HALF) TO 1 (ONE) TABLET AT BEDTIME AS NEEDED for sleeplessness    venlafaxine XR (EFFEXOR-XR) 225 mg, oral, Daily     No Known Allergies  Objective    The following test results have been reviewed:  Latest Complete Lab Results:  CBC:   Lab Results   Component Value Date    WBC 6.9 10/29/2024    RBC 4.48 10/29/2024    HGB 13.4 10/29/2024    HCT 41.9 10/29/2024    MCV 94 10/29/2024    MCH 29.9 10/29/2024  "   MCHC 32.0 10/29/2024     10/29/2024    MPV 11.1 10/23/2023     CMP:  Lab Results   Component Value Date    GLUCOSE 94 10/29/2024     10/29/2024    K 4.5 10/29/2024     10/29/2024    CO2 26 10/29/2024    ANIONGAP 14 10/29/2024    BUN 19 10/29/2024    CREATININE 0.98 10/29/2024    GFRF 67 08/15/2022    CALCIUM 9.2 10/29/2024    ALBUMIN 4.3 10/29/2024    ALKPHOS 85 10/29/2024    PROT 6.5 10/29/2024    AST 19 10/29/2024    BILITOT 0.6 10/29/2024    ALT 34 10/29/2024      Lipid:   Lab Results   Component Value Date    CHOL 188 10/29/2024    HDL 45.5 10/29/2024    CHHDL 4.1 10/29/2024    LDLF 123 (H) 08/15/2022    VLDL 32 10/29/2024    TRIG 161 (H) 10/29/2024     LDL Direct:  Lab Results   Component Value Date    LDLDIRECT 133 (H) 10/29/2024      TSH:   Lab Results   Component Value Date    TSH 3.00 03/10/2025      B12:  Lab Results   Component Value Date    CGPKJOOZ43 918 (H) 10/29/2024     Vitamin D:  Lab Results   Component Value Date    VITD25 39 10/29/2024      HgA1c:   Lab Results   Component Value Date    HGBA1C 5.5 10/29/2024    QSAFHWMB7Q 111 10/29/2024       Physical Exam  Vitals:      5/8/2023     1:43 PM 11/1/2023    11:32 AM 11/1/2023     1:28 PM 7/17/2024    10:31 AM 10/8/2024     8:19 AM 11/4/2024    12:28 PM 3/11/2025    10:26 AM   Vitals   Systolic 116 122  94  108 118   Diastolic 66 70  56  60 54   BP Location  Right arm        Heart Rate 75 64  74  72 64   Resp 16 18    18 18   Height 1.626 m (5' 4\") 1.6 m (5' 3\")  1.6 m (5' 3\") 1.626 m (5' 4\") 1.6 m (5' 3\") 1.6 m (5' 3\")   Weight (lb) 210 202 220 224 230 232 234   BMI 36.05 kg/m2 35.78 kg/m2 38.97 kg/m2 39.68 kg/m2 39.48 kg/m2 41.1 kg/m2 41.45 kg/m2   BSA (m2) 2.07 m2 2.02 m2 2.11 m2 2.13 m2 2.17 m2 2.16 m2 2.17 m2     Patient looks their usual self, is known to me, and is in no obvious distress.  HEENT:   Normocephalic, no facial asymmetry  Eyes: Sclera and conjunctiva are clear.  Neck: No adenopathy cervical (Ant/post/lat), no " Supraclavicular nodes.   Thyroid normal.  Carotid pulses normal, no carotid bruits.  Lungs : RR normal. Clear to auscultation anterior, posterior and lateral. No rales, wheezes rhonchi or rubs. Good air exchange.  Heart: RRR. No Murmur, gallop, click or rub.  Patient has a 1-2/6 systolic murmur heard URSB.  No radiation.  Unchanged.  Recommended repeating echocardiogram prior to next visit.  Last echocardiogram was 3 years ago.  Order placed.  Abdomen: Bowel sounds normal, No bruits. No pulsatile mass. No hepatosplenomegaly, masses or tenderness. Soft, no guarding.  Vascular:  Posterior tibialis and dorsalis pedis pulses within normal limits bilaterally.   Extremities: No upper extremity edema. No lower extremity edema.   Musculoskeletal: No synovitis of joints seen. No new deformity.  Neuro: CN 2-12 intact. Alert, appropriate.  Ambulates independently.  No gross motor deficit.   No tremors.  Psych: normal mood and affect.  Skin: No rash, bruising petechiae or jaundice.    Karis was seen today for follow-up.  Diagnoses and all orders for this visit:  Hypercholesterolemia (Primary)  -     Lipid Panel; Future  -     Comprehensive Metabolic Panel; Future  Hypothyroidism due to Hashimoto thyroiditis  Comments:  she wants to go to generic (pork) armour. march 2025 due to cost  Orders:  -     thyroid, pork, ([place] Thyroid) 90 mg tablet; Take 1 tablet (90 mg) by mouth once daily.  -     thyroid, pork, ([place] Thyroid) 30 mg tablet; Take 0.5 tablets (15 mg) by mouth 2 times a week. Take by mouth.  -     TSH with reflex to Free T4 if abnormal; Future  Depression, unspecified depression type  Anxiety  Migraine without aura and without status migrainosus, not intractable  Insomnia, unspecified type  Hypothyroidism due to Hashimoto thyroiditis  -     thyroid, pork, ([place] Thyroid) 90 mg tablet; Take 1 tablet (90 mg) by mouth once daily.  -     thyroid, pork, ([place] Thyroid) 30 mg tablet; Take 0.5 tablets (15 mg) by mouth 2  times a week. Take by mouth.  -     TSH with reflex to Free T4 if abnormal; Future  Family history of factor V Leiden mutation  -     Factor V Leiden; Future  -     Factor V Leiden  Family history of pulmonary embolism  -     Factor V Leiden; Future  -     Factor V Leiden  Family history of DVT  -     Factor V Leiden; Future  -     Factor V Leiden  Vitamin D deficiency  -     Vitamin D 25-Hydroxy,Total (for eval of Vitamin D levels); Future  Encounter for medication monitoring  -     TSH with reflex to Free T4 if abnormal; Future  Moderate episode of recurrent major depressive disorder  Class 3 severe obesity with serious comorbidity in adult, unspecified BMI, unspecified obesity type  B12 nutritional deficiency  -     CBC and Auto Differential; Future  -     Vitamin B12; Future  Pre-diabetes  -     Comprehensive Metabolic Panel; Future  -     Hemoglobin A1C; Future  Immunization counseling  Systolic ejection murmur  -     Transthoracic Echo (TTE) Complete; Future  Aortic valve insufficiency, acquired  -     Transthoracic Echo (TTE) Complete; Future  Menopause  -     XR DEXA bone density; Future        See patient instructions in wrap up plan, orders and comments for treatment plan.  Patient Instructions:  Schedule welcome to Medicare visit in 6 months.    Fasting blood test in the 2 weeks prior to that visit.  Instructions for obtaining lab tests:   You do not need a paper requisition when obtaining tests at a  facility. No appointment is needed for lab tests.  For fasting blood tests:  Please do not consume calories for 10-12 hours prior to this blood test. It is ok to drink water, you should be hydrated.  Please do not take vitamins, supplements or thyroid medication before your blood is drawn this day.   Most lab results should be available for your review on the  My Chart portal within 48 hours. Please contact the office if you have not received results within 2 weeks of obtaining the test.    Schedule bone  density and echocardiogram to be done in the month before your September appointment. One is in xray, the other in cardiology. Hold any vitamin d or calcium supplements for 2 days before the bone density.    Prevnar 20 or Prevnar 21 pneumococcal pneumonia vaccine recommended-can do any time, it is not seasonal.   RS one time vaccine is optional.       I am the Internal Medicine physician providing ongoing chronic medical care for this patient, which is managed during and in between office visits.    Scribe Attestation  By signing my name below, ILary, Scribe   attest that this documentation has been prepared under the direction and in the presence of Roseanne Correia MD.

## 2025-03-10 LAB — TSH SERPL-ACNC: 3 MIU/L (ref 0.4–4.5)

## 2025-03-11 ENCOUNTER — APPOINTMENT (OUTPATIENT)
Dept: PRIMARY CARE | Facility: CLINIC | Age: 65
End: 2025-03-11
Payer: COMMERCIAL

## 2025-03-11 VITALS
BODY MASS INDEX: 41.46 KG/M2 | RESPIRATION RATE: 18 BRPM | HEART RATE: 64 BPM | HEIGHT: 63 IN | WEIGHT: 234 LBS | SYSTOLIC BLOOD PRESSURE: 118 MMHG | DIASTOLIC BLOOD PRESSURE: 54 MMHG

## 2025-03-11 DIAGNOSIS — R01.1 SYSTOLIC EJECTION MURMUR: ICD-10-CM

## 2025-03-11 DIAGNOSIS — I35.1 AORTIC VALVE INSUFFICIENCY, ACQUIRED: ICD-10-CM

## 2025-03-11 DIAGNOSIS — Z83.2 FAMILY HISTORY OF FACTOR V LEIDEN MUTATION: ICD-10-CM

## 2025-03-11 DIAGNOSIS — Z78.0 MENOPAUSE: ICD-10-CM

## 2025-03-11 DIAGNOSIS — F33.1 MODERATE EPISODE OF RECURRENT MAJOR DEPRESSIVE DISORDER: ICD-10-CM

## 2025-03-11 DIAGNOSIS — Z51.81 ENCOUNTER FOR MEDICATION MONITORING: ICD-10-CM

## 2025-03-11 DIAGNOSIS — E66.01 CLASS 3 SEVERE OBESITY WITH SERIOUS COMORBIDITY IN ADULT, UNSPECIFIED BMI, UNSPECIFIED OBESITY TYPE: ICD-10-CM

## 2025-03-11 DIAGNOSIS — F32.A DEPRESSION, UNSPECIFIED DEPRESSION TYPE: ICD-10-CM

## 2025-03-11 DIAGNOSIS — E55.9 VITAMIN D DEFICIENCY: ICD-10-CM

## 2025-03-11 DIAGNOSIS — Z71.85 IMMUNIZATION COUNSELING: ICD-10-CM

## 2025-03-11 DIAGNOSIS — Z82.49 FAMILY HISTORY OF DVT: ICD-10-CM

## 2025-03-11 DIAGNOSIS — G47.00 INSOMNIA, UNSPECIFIED TYPE: ICD-10-CM

## 2025-03-11 DIAGNOSIS — E06.3 HYPOTHYROIDISM DUE TO HASHIMOTO THYROIDITIS: ICD-10-CM

## 2025-03-11 DIAGNOSIS — G43.009 MIGRAINE WITHOUT AURA AND WITHOUT STATUS MIGRAINOSUS, NOT INTRACTABLE: ICD-10-CM

## 2025-03-11 DIAGNOSIS — F41.9 ANXIETY: ICD-10-CM

## 2025-03-11 DIAGNOSIS — E53.8 B12 NUTRITIONAL DEFICIENCY: ICD-10-CM

## 2025-03-11 DIAGNOSIS — E66.813 CLASS 3 SEVERE OBESITY WITH SERIOUS COMORBIDITY IN ADULT, UNSPECIFIED BMI, UNSPECIFIED OBESITY TYPE: ICD-10-CM

## 2025-03-11 DIAGNOSIS — Z82.49 FAMILY HISTORY OF PULMONARY EMBOLISM: ICD-10-CM

## 2025-03-11 DIAGNOSIS — R73.03 PRE-DIABETES: ICD-10-CM

## 2025-03-11 DIAGNOSIS — E78.00 HYPERCHOLESTEROLEMIA: Primary | ICD-10-CM

## 2025-03-11 PROCEDURE — 1126F AMNT PAIN NOTED NONE PRSNT: CPT | Performed by: INTERNAL MEDICINE

## 2025-03-11 PROCEDURE — 1159F MED LIST DOCD IN RCRD: CPT | Performed by: INTERNAL MEDICINE

## 2025-03-11 PROCEDURE — 1123F ACP DISCUSS/DSCN MKR DOCD: CPT | Performed by: INTERNAL MEDICINE

## 2025-03-11 PROCEDURE — 1160F RVW MEDS BY RX/DR IN RCRD: CPT | Performed by: INTERNAL MEDICINE

## 2025-03-11 PROCEDURE — 3008F BODY MASS INDEX DOCD: CPT | Performed by: INTERNAL MEDICINE

## 2025-03-11 PROCEDURE — 1158F ADVNC CARE PLAN TLK DOCD: CPT | Performed by: INTERNAL MEDICINE

## 2025-03-11 PROCEDURE — 99215 OFFICE O/P EST HI 40 MIN: CPT | Performed by: INTERNAL MEDICINE

## 2025-03-11 PROCEDURE — G2211 COMPLEX E/M VISIT ADD ON: HCPCS | Performed by: INTERNAL MEDICINE

## 2025-03-11 RX ORDER — THYROID 90 MG/1
90 TABLET ORAL DAILY
Qty: 90 TABLET | Refills: 3 | Status: SHIPPED | OUTPATIENT
Start: 2025-03-11

## 2025-03-11 RX ORDER — THYROID 30 MG/1
15 TABLET ORAL 2 TIMES WEEKLY
Qty: 12 TABLET | Refills: 3 | Status: SHIPPED | OUTPATIENT
Start: 2025-03-13

## 2025-03-11 ASSESSMENT — PATIENT HEALTH QUESTIONNAIRE - PHQ9
SUM OF ALL RESPONSES TO PHQ9 QUESTIONS 1 AND 2: 0
1. LITTLE INTEREST OR PLEASURE IN DOING THINGS: NOT AT ALL
2. FEELING DOWN, DEPRESSED OR HOPELESS: NOT AT ALL

## 2025-03-11 ASSESSMENT — PAIN SCALES - GENERAL: PAINLEVEL_OUTOF10: 0-NO PAIN

## 2025-03-11 NOTE — PATIENT INSTRUCTIONS
Schedule welcome to Medicare visit in 6 months.    Fasting blood test in the 2 weeks prior to that visit.  Instructions for obtaining lab tests:   You do not need a paper requisition when obtaining tests at a  facility. No appointment is needed for lab tests.  For fasting blood tests:  Please do not consume calories for 10-12 hours prior to this blood test. It is ok to drink water, you should be hydrated.  Please do not take vitamins, supplements or thyroid medication before your blood is drawn this day.   Most lab results should be available for your review on the  My Chart portal within 48 hours. Please contact the office if you have not received results within 2 weeks of obtaining the test.    Schedule bone density and echocardiogram to be done in the month before your September appointment. One is in xray, the other in cardiology. Hold any vitamin d or calcium supplements for 2 days before the bone density.    Prevnar 20 or Prevnar 21 pneumococcal pneumonia vaccine recommended-can do any time, it is not seasonal.   RS one time vaccine is optional.

## 2025-03-25 ENCOUNTER — OFFICE VISIT (OUTPATIENT)
Dept: SLEEP MEDICINE | Facility: CLINIC | Age: 65
End: 2025-03-25
Payer: MEDICARE

## 2025-03-25 VITALS
HEART RATE: 72 BPM | WEIGHT: 234 LBS | OXYGEN SATURATION: 96 % | BODY MASS INDEX: 39.95 KG/M2 | SYSTOLIC BLOOD PRESSURE: 132 MMHG | HEIGHT: 64 IN | DIASTOLIC BLOOD PRESSURE: 83 MMHG

## 2025-03-25 DIAGNOSIS — G47.8 WAKES UP DURING NIGHT: ICD-10-CM

## 2025-03-25 DIAGNOSIS — G47.09 OTHER INSOMNIA: ICD-10-CM

## 2025-03-25 DIAGNOSIS — Z82.0 FAMILY HISTORY OF SLEEP APNEA: ICD-10-CM

## 2025-03-25 DIAGNOSIS — R06.83 SNORING: Primary | ICD-10-CM

## 2025-03-25 DIAGNOSIS — R40.0 DAYTIME SOMNOLENCE: ICD-10-CM

## 2025-03-25 PROCEDURE — 3008F BODY MASS INDEX DOCD: CPT | Performed by: NURSE PRACTITIONER

## 2025-03-25 PROCEDURE — 1160F RVW MEDS BY RX/DR IN RCRD: CPT | Performed by: NURSE PRACTITIONER

## 2025-03-25 PROCEDURE — 1036F TOBACCO NON-USER: CPT | Performed by: NURSE PRACTITIONER

## 2025-03-25 PROCEDURE — 99204 OFFICE O/P NEW MOD 45 MIN: CPT | Performed by: NURSE PRACTITIONER

## 2025-03-25 PROCEDURE — G2211 COMPLEX E/M VISIT ADD ON: HCPCS | Performed by: NURSE PRACTITIONER

## 2025-03-25 PROCEDURE — 1159F MED LIST DOCD IN RCRD: CPT | Performed by: NURSE PRACTITIONER

## 2025-03-25 PROCEDURE — 99214 OFFICE O/P EST MOD 30 MIN: CPT | Performed by: NURSE PRACTITIONER

## 2025-03-25 NOTE — ASSESSMENT & PLAN NOTE
Sleep onset and sleep maintenance insomnia  Currently trazodone 25-50 mg at bedtime, helps  Will continue to monitor

## 2025-03-25 NOTE — ASSESSMENT & PLAN NOTE
Will check HSAT and plan for CPAP pending results  Discussed SERGEI nd CPAP in detail today with Karis. She verbalized understanding and was agreeable  Discussed [facility] in brief today as well  Follow up in August for PAP compliance

## 2025-03-25 NOTE — PATIENT INSTRUCTIONS
Trazodone 25-50 mg for testing if you need     [facility] Sleep Medicine  [facility] [address]  [place] [address] [place] [address]       NAME: Karis Adrian   DATE:  03/25/25    DIAGNOSIS:   1. Wakes up during night  Referral to Adult Sleep Medicine    Home sleep apnea test (HSAT)      2. Daytime somnolence  Referral to Adult Sleep Medicine    Home sleep apnea test (HSAT)      3. Family history of sleep apnea  Referral to Adult Sleep Medicine    Home sleep apnea test (HSAT)          Thank you for coming to the Sleep Medicine Clinic today! Your sleep medicine provider today was: CARMEN Chilel Below is a summary of your treatment plan, other important information, and our contact numbers:    TREATMENT PLAN:   - Follow-up in 3-4 months.  - If not already done, sign up for 'My Chart' and send prescription requests or messages through this    Scheduling a Sleep Study    Call the  Sleep Testing Center to speak with a sleep testing  to book your overnight sleep study procedure at one of our adult and pediatric-friendly sleep labs. Overnight sleep studies may be scheduled on a weekday or weekend.    We have child life services on a case by case basis at the [facility] location. We also perform daytime testing for shift workers on a case by case basis.    Locations for sleep studies are: [facility], [facility] ([facility]), [facility], [facility], [place], [place].    Bring your usual medications and nightly routine items for your sleep study. In order to fall asleep faster in sleep lab, we advise patients to wake up earlier on the morning of the scheduled testing and avoid napping prior to testing. Sometimes, your provider may prescribe a sleep aid to be taken at lights out in the sleep lab. If you are taking a sleep aid, please have somebody pick you up after the sleep testing.    Results of your sleep study  will be given to the ordering clinician. Please contact their office for results or follow up as directed by your clinician.    For additional information about the sleep medicine services, please call [phone]       Obstructive sleep apnea (SERGEI): SERGEI is a sleep disorder where your upper airway muscles relax during sleep and the airway intermittently and repetitively narrows and collapses leading to blocked airway (apnea) which, in turn, can disrupt breathing in sleep, lower oxygen levels while you sleep and cause night time wakings. Because apnea may cause higher carbon dioxide or low oxygen levels, untreated SERGEI can lead to heart arrhythmia, elevation of blood pressure, and make it harder for the body to consolidate memory and metabolize (leading to higher blood sugars at night).   Frequent partial arousals occur during sleep resulting in sleep deprivation and daytime sleepiness. SERGEI is associated with an increased risk of cardiovascular disease, stroke, hypertension, and insulin resistance. Moreover, untreated SERGEI with excessive daytime sleepiness can increase the risk of motor vehicular accidents.    Some conservative strategies for SERGEI are:   Positional therapy - Avoid sleeping on your back.   Healthy diet, exercise, and optimizing weight encouraged.   Avoid alcohol late in the evening as it can make sleep apnea worse.     Safety: Avoid driving and operating heavy equipment while sleepy. Drowsy driving may lead to life-threatening motor vehicle accidents.     Common treatment options for sleep apnea include weight management, positional therapy, Positive Airway Therapy (PAP) therapy, oral appliance therapy, hypoglossal nerve stimulation, and select airway surgeries.     Instructions - Common SERGEI Recs: - For your sleep apnea, continue to use your PAP every night and use it whenever you are sleeping.   - Avoid alcohol or sedatives several hours prior to sleeping.   - Get additional supplies for your PAP  (e.g., mask, hose, filters) every 3 months or as your insurance allows from your [employer] company. Replacement cushions for your PAP mask can be requested monthly if airseals are an issue.  - Remember to clean your mask, tubings, and water chamber regularly as instructed.  - Avoid driving or operating heavy machinery when drowsy. A person driving while sleepy is five (5) times more likely to have an accident. If you feel sleepy, pull over and take a short power nap (sleep for less than 30 minutes). Otherwise, ask somebody to drive you.    EASY WAYS TO IMPROVE YOUR SLEEP:  1. Go to bed and wake up at the same time every day.   Aim for 8 hours but some people need less, some need more.   Get out of bed if you are not sleeping.   Limit naps to 20 min or less.   2. Expose yourself to daylight and/ or bright light in the morning.   Go outside or spend time near a window each morning.   You can use a light box (found on [employer]) if you wake before the sunrise.   Limit light exposure in the evenings (including electronic usage).   Try meditation, reading, stretching, deep breathing, warm shower or bath, or yoga nidra as part of your bedtime routine. There are many great FREE, videos or audio tracks on YouTube/ [employer], etc for guidance.  3. Exercise, in some form, EVERY day, but not too close to bedtime. Consider making this part of your routine at the start of your day, followed by a cool shower.  4. Eat meals at roughly the same time every day. Make sure you are prioritizing fruits, vegetables, whole grains, lean proteins.  5. Time your caffeine intake. Make sure you are not drinking caffeine within 8 to 12 hours prior to your bedtime.   6. Avoid marijuana, alcohol, and nicotine. They will reduce sleep quality in any quantity.  7. Learn to manage anxiety. Psychology services at  can be reached at [phone] to schedule an appointment.     IMPORTANT INFORMATION:     Call 911 for medical emergencies.  Our offices are  generally open from Monday-Friday, 9 am - 5 pm.  If you need to get in touch with me, you may either call me and my team(number is below) or you can use [employer]  If a referral for a test, for CPAP, or for another specialist was made, and you have not heard about scheduling this within a week, please call scheduling at [phone]).  If you are unable to make your appointment for clinic or an overnight study, kindly call the office at least 48 hours in advance to cancel and reschedule.  If you are on CPAP, please bring your device's card to each clinic appointment unless told otherwise by your provider.  There are no supporting services by either the sleep doctors or their staff on weekends and Holidays, or after 5 PM on weekdays.   If you have been asked to come to a sleep study, make sure you bring toiletries, a comfy pillow, and any nighttime medications that you may regularly take. Also be sure to eat dinner before you arrive. We generally do not provide meals.      PRESCRIPTIONS:  We require 7 days advanced notice for prescription refills. If we do not receive the request in this time, we cannot guarantee that your medication will be refilled in time. Please contact the sleep nurses listed below for refills or request via [employer].     IMPORTANT PHONE NUMBERS:   Sleep Medicine Clinic Fax: [phone]  Appointments (for Pediatric Sleep Clinic): [phone] - option 1  Appointments (for Adult Sleep Clinic): [phone] - option 2  Appointments (For Sleep Studies): [phone] - option 3  Behavioral Sleep Medicine: [phone]  Sleep Surgery: [phone]  ENT (Otolaryngology): [phone]  Headache Clinic (Neurology): [phone]  Neurology: [phone]  Psychiatry: [phone]  Pulmonary Function Testing (PFT) Center: [phone]  Pulmonary Medicine: [phone]  [employer] (DME): [phone]  [employer] (DME): [phone]  [facility] (DME):  [phone]    Our Adult Sleep Medicine Team (Please do not hesitate to call the office or sleep nurse with any questions between appointments):    Adult Sleep Nurses (Helena Romo, RN and Judith Dobbins RN):  For clinical questions and refilling prescriptions: [phone]  Email sleep diaries and other documents at: [email or web address]    Adult Sleep Medicine Secretaries:  Deidre Case (For Gustavo/Tomas/Krise/Strohl/Yeh): P: [phone]  F: [phone]  Maurilio Mckeon ([place])Office: [phone] option 4 Office Fax: [phone]  Pamela Simpson (For Julian): P: [phone]  Fax: [phone]  Ira Huffman (For Jurcevic/Blank): P: [phone]  F: [phone]  Catalina Desai (For Justin): P: [phone]  F: [phone]  Daja Patel (For Renata/Nadege/Zakhary): P: [phone]  F: [phone]  Makayla Garcia (For Carlos/Bass): P: [phone]  F: [phone]     Adult Sleep Medicine Advanced Practice Providers:  Tyrese Burnham ([place], [place])  Ambreen Light ([facility])  Marilu Beth CNP ([place], [place], [place])  Sarah Benítez CNP ([place], [place], [place])  Rebeka Anderson ([place], [place], [place])  Ruthie Bass CNP ([facility])      Our Sleep Testing Center (STC) Locations:  Our team will contact you to schedule your sleep study, however, you can contact us as follow:  Main Phone Line (scheduling only): [phone]), option 3  Adult and Pediatric Locations  [facility] (6 years and older): [facility] - 4th floor ([address] [place] [address]) After hours line: [phone]  [facility] (Main campus: All ages): [place], 6th floor. After hours line: [phone]   [place] (5 years and older; younger considered on case-by-case basis): [address] [facility]; [address], [address]. Scheduling  After hours line: [phone]   [place] (6 years and older):  "[address]; [address]; [address]   [place] (6 years and older): [address], [address]  After hours line: [phone]   [place] (13 years and older) in [place]: [address], 2nd floor  After hours line: [phone]   [place] (13 year and older): [address]  After hours line: [phone] (Home studies out of [facility])    Adult Only Locations:   [place] (18 years and older): [address] [facility], 2nd floor   [place] (18 years and older): [address] [facility]; 4th floor  After hours line: [phone]  [facility] (18 years and older) at [place]: [address] [place] [address]  After hours line: [phone]        CONTACTING YOUR SLEEP MEDICINE PROVIDER:  Send a message directly to your provider through \"My Chart\", which is the email service through your  Records Account: [email or web address]   Call [phone] and leave a message. One of the administrative assistants will forward the message to your sleep medicine provider through \"My Chart\" and/or email.     Your sleep medicine provider for this visit was: CARMEN Chilel    In the event that you are running more than 15 minutes late to your appointment, I will kindly ask you to reschedule.       "
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
## 2025-04-02 ENCOUNTER — PROCEDURE VISIT (OUTPATIENT)
Dept: SLEEP MEDICINE | Facility: HOSPITAL | Age: 65
End: 2025-04-02
Payer: MEDICARE

## 2025-04-02 DIAGNOSIS — Z82.0 FAMILY HISTORY OF SLEEP APNEA: ICD-10-CM

## 2025-04-02 DIAGNOSIS — G47.8 WAKES UP DURING NIGHT: ICD-10-CM

## 2025-04-02 DIAGNOSIS — R40.0 DAYTIME SOMNOLENCE: ICD-10-CM

## 2025-04-02 PROCEDURE — 95806 SLEEP STUDY UNATT&RESP EFFT: CPT | Performed by: INTERNAL MEDICINE

## 2025-04-09 DIAGNOSIS — G43.001 MIGRAINE WITHOUT AURA AND WITH STATUS MIGRAINOSUS, NOT INTRACTABLE: ICD-10-CM

## 2025-04-11 RX ORDER — TOPIRAMATE 100 MG/1
100 TABLET, FILM COATED ORAL NIGHTLY
Qty: 30 TABLET | Refills: 0 | Status: SHIPPED | OUTPATIENT
Start: 2025-04-11

## 2025-04-11 NOTE — TELEPHONE ENCOUNTER
Refill needed, no medication left    topiramate (Topamax) 100 mg tablet   100 mg, Nightly   D[facility] [place]

## 2025-04-17 ENCOUNTER — HOSPITAL ENCOUNTER (OUTPATIENT)
Dept: RADIOLOGY | Facility: CLINIC | Age: 65
Discharge: HOME | End: 2025-04-17
Payer: MEDICARE

## 2025-04-17 DIAGNOSIS — R06.83 SNORING: Primary | ICD-10-CM

## 2025-04-17 DIAGNOSIS — E78.00 HYPERCHOLESTEROLEMIA: ICD-10-CM

## 2025-04-17 DIAGNOSIS — R40.0 DAYTIME SOMNOLENCE: ICD-10-CM

## 2025-04-17 PROCEDURE — 75571 CT HRT W/O DYE W/CA TEST: CPT

## 2025-06-14 ENCOUNTER — PROCEDURE VISIT (OUTPATIENT)
Dept: SLEEP MEDICINE | Facility: CLINIC | Age: 65
End: 2025-06-14
Payer: MEDICARE

## 2025-06-14 VITALS
HEIGHT: 64 IN | DIASTOLIC BLOOD PRESSURE: 83 MMHG | SYSTOLIC BLOOD PRESSURE: 136 MMHG | BODY MASS INDEX: 39.26 KG/M2 | WEIGHT: 229.98 LBS

## 2025-06-14 DIAGNOSIS — G47.9 SLEEP DISORDER, UNSPECIFIED: ICD-10-CM

## 2025-06-14 DIAGNOSIS — R40.0 DAYTIME SOMNOLENCE: ICD-10-CM

## 2025-06-14 DIAGNOSIS — R06.83 SNORING: ICD-10-CM

## 2025-06-14 PROCEDURE — 95810 POLYSOM 6/> YRS 4/> PARAM: CPT | Performed by: PSYCHIATRY & NEUROLOGY

## 2025-06-14 ASSESSMENT — SLEEP AND FATIGUE QUESTIONNAIRES
HOW LIKELY ARE YOU TO NOD OFF OR FALL ASLEEP WHILE SITTING QUIETLY AFTER LUNCH WITHOUT ALCOHOL: WOULD NEVER DOZE
ESS-CHAD TOTAL SCORE: 12
HOW LIKELY ARE YOU TO NOD OFF OR FALL ASLEEP WHILE WATCHING TV: SLIGHT CHANCE OF DOZING
HOW LIKELY ARE YOU TO NOD OFF OR FALL ASLEEP WHILE LYING DOWN TO REST IN THE AFTERNOON WHEN CIRCUMSTANCES PERMIT: HIGH CHANCE OF DOZING
SITING INACTIVE IN A PUBLIC PLACE LIKE A CLASS ROOM OR A MOVIE THEATER: WOULD NEVER DOZE
HOW LIKELY ARE YOU TO NOD OFF OR FALL ASLEEP IN A CAR, WHILE STOPPED FOR A FEW MINUTES IN TRAFFIC: MODERATE CHANCE OF DOZING
HOW LIKELY ARE YOU TO NOD OFF OR FALL ASLEEP WHILE SITTING AND READING: MODERATE CHANCE OF DOZING
HOW LIKELY ARE YOU TO NOD OFF OR FALL ASLEEP WHEN YOU ARE A PASSENGER IN A CAR FOR AN HOUR WITHOUT A BREAK: HIGH CHANCE OF DOZING
HOW LIKELY ARE YOU TO NOD OFF OR FALL ASLEEP WHILE SITTING AND TALKING TO SOMEONE: SLIGHT CHANCE OF DOZING

## 2025-06-15 NOTE — PROGRESS NOTES
[facility] TECH NOTE:     Patient: Karis Adrian   MRN//AGE: [id number]  1960  65 y.o.   Technologist: Lina Greco   Room: 3   Service Date: 2025        Sleep Testing Location: [facility]:     TECHNOLOGIST SLEEP STUDY PROCEDURE NOTE:   This sleep study is being conducted according to the policies and procedures outlined by the AAS accreditation standards.  The sleep study procedure and processes involved during this appointment was explained to the patient/patient’s family, questions were answered. The patient/family verbalized understanding.      The patient is a 65 y.o. year old female scheduled for a Diagnostic PSG with montage of: Diagnostic PSG. she arrived for her appointment.      The study that was ultimately completed was aDiagnostic PSG with montage of: Diagnostic PSG.    The full study Was completed.  Patient questionnaires completed?: yes     Consents signed? yes    Initial Fall Risk Screening:     Karis has not fallen in the last 6 months. her did not result in injury. Karis does not have a fear of falling. He does not need assistance with sitting, standing, or walking. she does not need assistance walking in her home. she does not need assistance in an unfamiliar setting. The patient is notusing an assistive device.     Brief Study observations: All sensors applied.  Full study completed.     Deviation to order/protocol and reason: None      Other:None    After the procedure, the patient/family was informed to ensure followup with ordering clinician for testing results.      Technologist: RONDA Parker

## 2025-06-19 ENCOUNTER — OFFICE VISIT (OUTPATIENT)
Dept: URGENT CARE | Age: 65
End: 2025-06-19
Payer: MEDICARE

## 2025-06-19 VITALS
HEART RATE: 80 BPM | RESPIRATION RATE: 18 BRPM | DIASTOLIC BLOOD PRESSURE: 80 MMHG | BODY MASS INDEX: 39.78 KG/M2 | WEIGHT: 233 LBS | SYSTOLIC BLOOD PRESSURE: 127 MMHG | HEIGHT: 64 IN | TEMPERATURE: 98.5 F | OXYGEN SATURATION: 96 %

## 2025-06-19 DIAGNOSIS — B35.4 TINEA CORPORIS: Primary | ICD-10-CM

## 2025-06-19 DIAGNOSIS — H60.501 ACUTE OTITIS EXTERNA OF RIGHT EAR, UNSPECIFIED TYPE: ICD-10-CM

## 2025-06-19 DIAGNOSIS — H60.11 CELLULITIS OF AURICLE OF RIGHT EAR: ICD-10-CM

## 2025-06-19 RX ORDER — SULFAMETHOXAZOLE AND TRIMETHOPRIM 800; 160 MG/1; MG/1
1 TABLET ORAL 2 TIMES DAILY
Qty: 14 TABLET | Refills: 0 | Status: SHIPPED | OUTPATIENT
Start: 2025-06-19 | End: 2025-06-26

## 2025-06-19 RX ORDER — NEOMYCIN SULFATE, POLYMYXIN B SULFATE, HYDROCORTISONE 3.5; 10000; 1 MG/ML; [USP'U]/ML; MG/ML
3 SOLUTION/ DROPS AURICULAR (OTIC) 4 TIMES DAILY
Qty: 10 ML | Refills: 0 | Status: SHIPPED | OUTPATIENT
Start: 2025-06-19 | End: 2025-06-26

## 2025-06-19 ASSESSMENT — PAIN SCALES - GENERAL: PAINLEVEL_OUTOF10: 5
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
## 2025-08-05 ENCOUNTER — OFFICE VISIT (OUTPATIENT)
Dept: SLEEP MEDICINE | Facility: CLINIC | Age: 65
End: 2025-08-05
Payer: MEDICARE

## 2025-08-05 VITALS
OXYGEN SATURATION: 93 % | SYSTOLIC BLOOD PRESSURE: 117 MMHG | BODY MASS INDEX: 39.71 KG/M2 | HEART RATE: 74 BPM | WEIGHT: 232.6 LBS | DIASTOLIC BLOOD PRESSURE: 60 MMHG | HEIGHT: 64 IN

## 2025-08-05 DIAGNOSIS — R06.83 SNORING: Primary | ICD-10-CM

## 2025-08-05 DIAGNOSIS — G47.00 INSOMNIA, UNSPECIFIED TYPE: ICD-10-CM

## 2025-08-05 DIAGNOSIS — E78.5 HYPERLIPIDEMIA, UNSPECIFIED HYPERLIPIDEMIA TYPE: ICD-10-CM

## 2025-08-05 PROCEDURE — 99214 OFFICE O/P EST MOD 30 MIN: CPT | Performed by: NURSE PRACTITIONER

## 2025-08-05 PROCEDURE — 1159F MED LIST DOCD IN RCRD: CPT | Performed by: NURSE PRACTITIONER

## 2025-08-05 PROCEDURE — 1160F RVW MEDS BY RX/DR IN RCRD: CPT | Performed by: NURSE PRACTITIONER

## 2025-08-05 PROCEDURE — 1126F AMNT PAIN NOTED NONE PRSNT: CPT | Performed by: NURSE PRACTITIONER

## 2025-08-05 PROCEDURE — 3008F BODY MASS INDEX DOCD: CPT | Performed by: NURSE PRACTITIONER

## 2025-08-05 PROCEDURE — 1036F TOBACCO NON-USER: CPT | Performed by: NURSE PRACTITIONER

## 2025-08-05 ASSESSMENT — SLEEP AND FATIGUE QUESTIONNAIRES
DIFFICULTY_FALLING_ASLEEP: MILD
SATISFACTION_WITH_CURRENT_SLEEP_PATTERN: SATISFIED
ESS-CHAD TOTAL SCORE: 5
HOW LIKELY ARE YOU TO NOD OFF OR FALL ASLEEP WHILE WATCHING TV: WOULD NEVER DOZE
HOW LIKELY ARE YOU TO NOD OFF OR FALL ASLEEP WHILE LYING DOWN TO REST IN THE AFTERNOON WHEN CIRCUMSTANCES PERMIT: MODERATE CHANCE OF DOZING
HOW LIKELY ARE YOU TO NOD OFF OR FALL ASLEEP WHILE SITTING AND TALKING TO SOMEONE: WOULD NEVER DOZE
HOW LIKELY ARE YOU TO NOD OFF OR FALL ASLEEP WHILE SITTING AND READING: SLIGHT CHANCE OF DOZING
WORRIED_DISTRESSED_DUE_TO_SLEEP: A LITTLE
HOW LIKELY ARE YOU TO NOD OFF OR FALL ASLEEP WHEN YOU ARE A PASSENGER IN A CAR FOR AN HOUR WITHOUT A BREAK: WOULD NEVER DOZE
HOW LIKELY ARE YOU TO NOD OFF OR FALL ASLEEP WHILE SITTING QUIETLY AFTER LUNCH WITHOUT ALCOHOL: WOULD NEVER DOZE
SLEEP_PROBLEM_INTERFERES_DAILY_ACTIVITIES: SOMEWHAT
HOW LIKELY ARE YOU TO NOD OFF OR FALL ASLEEP IN A CAR, WHILE STOPPED FOR A FEW MINUTES IN TRAFFIC: SLIGHT CHANCE OF DOZING
SITING INACTIVE IN A PUBLIC PLACE LIKE A CLASS ROOM OR A MOVIE THEATER: SLIGHT CHANCE OF DOZING
DIFFICULTY_STAYING_ASLEEP: MODERATE
SLEEP_PROBLEM_NOTICEABLE_TO_OTHERS: SOMEWHAT

## 2025-08-05 ASSESSMENT — ENCOUNTER SYMPTOMS
LOSS OF SENSATION IN FEET: 0
OCCASIONAL FEELINGS OF UNSTEADINESS: 0

## 2025-08-05 ASSESSMENT — PAIN SCALES - GENERAL: PAINLEVEL_OUTOF10: 0-NO PAIN

## 2025-08-05 NOTE — TELEPHONE ENCOUNTER
Refill    colestipol (Colestid) 1 gram tablet   2 g, 2 times daily   [facility]    She would like to use [facility] for her insurance

## 2025-08-05 NOTE — ASSESSMENT & PLAN NOTE
No significant sleep disordered breathing on PSG June 14 for Medicare scoring rules. Discussed mild SERGEI per AASM scoring.  -Discussed results in detail. She verbalized understanding  -Discussed option to purchase CPAP OOP, dental appliance, weight loss, continue positional therapy. She verbalized understanding  -Follow up as needed.

## 2025-08-05 NOTE — PATIENT INSTRUCTIONS
[facility]  [facility] [address]  [facility]  [address]  [place] [address]       NAME: Karis Adrian   DATE:  08/05/25    DIAGNOSIS:   1. Snoring        2. Insomnia, unspecified type            Thank you for coming to the Sleep Medicine Clinic today! Your sleep medicine provider today was: Marilu Beth, LEW-CNP Below is a summary of your treatment plan, other important information, and our contact numbers:    TREATMENT PLAN:   - Follow-up in PRN  - If not already done, sign up for 'My Chart' and send prescription requests or messages through this    Obstructive sleep apnea (SERGEI): SERGEI is a sleep disorder where your upper airway muscles relax during sleep and the airway intermittently and repetitively narrows and collapses leading to blocked airway (apnea) which, in turn, can disrupt breathing in sleep, lower oxygen levels while you sleep and cause night time wakings. Because apnea may cause higher carbon dioxide or low oxygen levels, untreated SERGEI can lead to heart arrhythmia, elevation of blood pressure, and make it harder for the body to consolidate memory and metabolize (leading to higher blood sugars at night).   Frequent partial arousals occur during sleep resulting in sleep deprivation and daytime sleepiness. SERGEI is associated with an increased risk of cardiovascular disease, stroke, hypertension, and insulin resistance. Moreover, untreated SERGEI with excessive daytime sleepiness can increase the risk of motor vehicular accidents.    Some conservative strategies for SERGEI are:   Positional therapy - Avoid sleeping on your back.   Healthy diet, exercise, and optimizing weight encouraged.   Avoid alcohol late in the evening as it can make sleep apnea worse.     Safety: Avoid driving and operating heavy equipment while sleepy. Drowsy driving may lead to life-threatening motor vehicle accidents.     Common treatment options for sleep apnea include  "weight management, positional therapy, Positive Airway Therapy (PAP) therapy, oral appliance therapy, hypoglossal nerve stimulation, and select airway surgeries.     Insomnia care:  Insomnia, or trouble falling asleep or staying asleep, can be caused by many different things such as untreated sleep apnea, anxiety, depression, stress, poor sleep habits and other medical conditions or medications. The best way to treat insomnia is to treat the cause. In general, we can all benefit from better sleep hygiene. Some recommendations to help you improve your sleep hygiene so you can fall asleep, stay asleep, and wake up felling refreshed include:    Keep a routine bedtime and rise time.  Avoid caffeine in the late afternoon and early evening, including chocolate.   Keep your bedroom technology free. Avoid TV and electronics one hour prior to bedtime. Don't have a clock visible in your room.  Set up a 'worry\" time in the late afternoon to cope with her worries and plan for the following day.  Avoid naps. If necessary, keep naps to under 15-20 minutes.  Develop a relaxing routine before bed.  Keep the bedroom for sleeping and intimacy only.  Make your bedroom dark, quiet, and comfortable temperature.  Do not exercise right before bed. Do get 20-30 minutes of exercise during your day.   Do not stay in bed for more than 30 minutes if you cannot sleep. Leave your bedroom and find a dull activity to do until you feel you could sleep. Then return to your bed.   Don't sleep with your pet.   A light bedtime snack such as a glass of milk and banana can promote sleep.    You have been recommended to Cognitive Behavioral Therapy for Insomnia (CBT-I). CBT-I is widely recognized as an effective treatment for a wide range of insomnias. The treatment is typically made up of a number of components including assessment, behavioral and cognitive interventions, motivational techniques and relapse prevention skills. An overwhelming amount of " evidence suggests that CBT-I is as effective as hypnotics and the newer non-benzodiazepine sleep aides in the acute treatment and is more effective than medication in the long term treatment of insomnia.     CBT-I at  - Rebeka Miltonvale, NP  GoToSleep- online course via [facility]. Self-guided. One time charge to sign up: Sandra  SleepEZ- Free self-guided course from the [facility] https://www.veterantraining.va.gov/insomnia/  Dr. Wylie - one on one CBT-I service [email or web address]       Instructions - Common SERGEI Recs: - For your sleep apnea, continue to use your PAP every night and use it whenever you are sleeping.   - Avoid alcohol or sedatives several hours prior to sleeping.   - Get additional supplies for your PAP (e.g., mask, hose, filters) every 3 months or as your insurance allows from your [employer] company. Replacement cushions for your PAP mask can be requested monthly if airseals are an issue.  - Remember to clean your mask, tubings, and water chamber regularly as instructed.  - Avoid driving or operating heavy machinery when drowsy. A person driving while sleepy is five (5) times more likely to have an accident. If you feel sleepy, pull over and take a short power nap (sleep for less than 30 minutes). Otherwise, ask somebody to drive you.    EASY WAYS TO IMPROVE YOUR SLEEP:  1. Go to bed and wake up at the same time every day.   Aim for 8 hours but some people need less, some need more.   Get out of bed if you are not sleeping.   Limit naps to 20 min or less.   2. Expose yourself to daylight and/ or bright light in the morning.   Go outside or spend time near a window each morning.   You can use a light box (found on Amazon) if you wake before the sunrise.   Limit light exposure in the evenings (including electronic usage).   Try meditation, reading, stretching, deep breathing, warm shower or bath, or yoga nidra as part of your bedtime routine. There are many great FREE, videos or audio tracks on [employer]/ [employer], etc  for guidance.  3. Exercise, in some form, EVERY day, but not too close to bedtime. Consider making this part of your routine at the start of your day, followed by a cool shower.  4. Eat meals at roughly the same time every day. Make sure you are prioritizing fruits, vegetables, whole grains, lean proteins.  5. Time your caffeine intake. Make sure you are not drinking caffeine within 8 to 12 hours prior to your bedtime.   6. Avoid marijuana, alcohol, and nicotine. They will reduce sleep quality in any quantity.  7. Learn to manage anxiety. Psychology services at  can be reached at [phone] to schedule an appointment.     IMPORTANT INFORMATION:     Call 911 for medical emergencies.  Our offices are generally open from Monday-Friday, 9 am - 5 pm.  If you need to get in touch with me, you may either call me and my team(number is below) or you can use [facility].  If a referral for a test, for CPAP, or for another specialist was made, and you have not heard about scheduling this within a week, please call scheduling at [phone] ([phone]).  If you are unable to make your appointment for clinic or an overnight study, kindly call the office at least 48 hours in advance to cancel and reschedule.  If you are on CPAP, please bring your device's card to each clinic appointment unless told otherwise by your provider.  There are no supporting services by either the sleep doctors or their staff on weekends and Holidays, or after 5 PM on weekdays.   If you have been asked to come to a sleep study, make sure you bring toiletries, a comfy pillow, and any nighttime medications that you may regularly take. Also be sure to eat dinner before you arrive. We generally do not provide meals.      PRESCRIPTIONS:  We require 7 days advanced notice for prescription refills. If we do not receive the request in this time, we cannot guarantee that your medication will be refilled in time. Please contact the sleep nurses listed below for  refills or request via [employer].     IMPORTANT PHONE NUMBERS:   Sleep Medicine Clinic Fax: [phone]  Appointments (for Pediatric Sleep Clinic): [phone] - option 1  Appointments (for Adult Sleep Clinic): [phone] - option 2  Appointments (For Sleep Studies): [phone] - option 3  Behavioral Sleep Medicine: [phone]  Sleep Surgery: [phone]  ENT (Otolaryngology): [phone]  Headache Clinic (Neurology): [phone]  Neurology: [phone]  Psychiatry: [phone]  Pulmonary Function Testing (PFT) Center: [phone]  Pulmonary Medicine: [phone]  [employer] (DME): [phone]  [facility] (DME): [phone]  [facility] ([facility]): [phone]    Our Adult Sleep Medicine Team (Please do not hesitate to call the office or sleep nurse with any questions between appointments):    Adult Sleep Nurses (Helena Romo, MERCED and Judith Dobbins RN):  For clinical questions and refilling prescriptions: [phone]  Email sleep diaries and other documents at: [email or web address]    Adult Sleep Medicine Secretaries:  Deidre Case (For Gustavo/Tomas/Krise/Strohl/Yeh): P: [phone]  F: [phone]  Maurilio Mckeon ([place])Office: [phone] option 4 Office Fax: [phone]  Pamela Simpson (For Julian): P: [phone]  Fax: [phone]  Ira Huffman (For Jurcevic/Blank): P: [phone]  F: [phone]  Catalina Desai (For [place]): P: [phone]  F: [phone]  Daja Patel (For Renata/Nadege/Zakhary): P: [phone]  F: [phone]  Makayla Garcia (For Carlos/Kali): P: [phone]  F: [phone]     Adult Sleep Medicine Advanced Practice Providers:  Tyrese Burnham ([place], [place])  Ambreen Light ([facility])  Marilu Beth CNP ([facility], [facility])  Sarah Benítez, LAURENT ([place], [place], [place])  Rebeka Anderson ([facility])  Ruthie Bass CNP ([facility],  "[place])      Our Sleep Testing Center (STC) Locations:  Our team will contact you to schedule your sleep study, however, you can contact us as follow:  Main Phone Line (scheduling only): [phone] ([phone]), option 3  Adult and Pediatric Locations   [place] (6 years and older): [facility] - 4th [facility] ([address], [facility]) After hours line: [phone]  [facility] (Main campus: All ages): [facility], 6th floor. After hours line: [phone]   [place] (5 years and older; younger considered on case-by-case basis): [address]; [address], [address]. Scheduling  After hours line: [phone]   [place] (6 years and older): [address]; [address]; [address]   [place] (6 years and older): [address] [facility] [place], [address]  After hours line: [phone]   Scientologist (13 years and older) in [place]: [address], 2nd floor  After hours line: [phone]   [place] (13 year and older): [address]  After hours line: [phone] (Home studies out of [facility])    Adult Only Locations:   [place] (18 years and older): [address] [facility], 2nd floor   [place] (18 years and older): [address] [facility]; 4th floor  After hours line: [phone]  [facility] (18 years and older) at [place]: [address] [facility]  After hours line: [phone]        CONTACTING YOUR SLEEP MEDICINE PROVIDER:  Send a message directly to your provider through \"My Chart\", which is the email service through your  Records Account: [email or web address]   Call [phone] and leave a message. One of the administrative assistants will forward the message to your sleep medicine provider through \"My Chart\" and/or email.     Your sleep medicine provider for this visit was: CARMEN Chilel    In the event that you are running more than 15 minutes late to " your appointment, I will kindly ask you to reschedule.

## 2025-08-06 RX ORDER — COLESTIPOL HYDROCHLORIDE 1 G/1
2 TABLET ORAL 2 TIMES DAILY
Qty: 360 TABLET | Refills: 3 | Status: SHIPPED | OUTPATIENT
Start: 2025-08-06

## 2025-08-12 ENCOUNTER — HOSPITAL ENCOUNTER (OUTPATIENT)
Dept: CARDIOLOGY | Facility: CLINIC | Age: 65
Discharge: HOME | End: 2025-08-12
Payer: MEDICARE

## 2025-08-12 DIAGNOSIS — R01.1 SYSTOLIC EJECTION MURMUR: ICD-10-CM

## 2025-08-12 DIAGNOSIS — I35.1 AORTIC VALVE INSUFFICIENCY, ACQUIRED: ICD-10-CM

## 2025-08-12 PROCEDURE — 93306 TTE W/DOPPLER COMPLETE: CPT | Performed by: STUDENT IN AN ORGANIZED HEALTH CARE EDUCATION/TRAINING PROGRAM

## 2025-08-12 PROCEDURE — 93306 TTE W/DOPPLER COMPLETE: CPT

## 2025-08-13 LAB
AORTIC VALVE MEAN GRADIENT: 9 MMHG
AORTIC VALVE PEAK VELOCITY: 2.24 M/S
AV PEAK GRADIENT: 20 MMHG
AVA (PEAK VEL): 1.89 CM2
AVA (VTI): 2.19 CM2
EJECTION FRACTION APICAL 4 CHAMBER: 68.5
EJECTION FRACTION: 63 %
LEFT ATRIUM VOLUME AREA LENGTH INDEX BSA: 21 ML/M2
LEFT VENTRICLE INTERNAL DIMENSION DIASTOLE: 4.25 CM (ref 3.5–6)
LEFT VENTRICULAR OUTFLOW TRACT DIAMETER: 1.93 CM
MITRAL VALVE E/A RATIO: 0.7
RIGHT VENTRICLE FREE WALL PEAK S': 14 CM/S
RIGHT VENTRICLE PEAK SYSTOLIC PRESSURE: 25 MMHG
TRICUSPID ANNULAR PLANE SYSTOLIC EXCURSION: 2.4 CM

## 2025-08-14 ENCOUNTER — HOSPITAL ENCOUNTER (OUTPATIENT)
Dept: RADIOLOGY | Facility: CLINIC | Age: 65
Discharge: HOME | End: 2025-08-14
Payer: MEDICARE

## 2025-08-14 DIAGNOSIS — Z78.0 MENOPAUSE: ICD-10-CM

## 2025-08-14 PROCEDURE — 77080 DXA BONE DENSITY AXIAL: CPT | Performed by: STUDENT IN AN ORGANIZED HEALTH CARE EDUCATION/TRAINING PROGRAM

## 2025-08-14 PROCEDURE — 77080 DXA BONE DENSITY AXIAL: CPT

## 2025-09-23 ENCOUNTER — APPOINTMENT (OUTPATIENT)
Dept: PRIMARY CARE | Facility: CLINIC | Age: 65
End: 2025-09-23
Payer: MEDICARE